# Patient Record
Sex: FEMALE | Race: WHITE | NOT HISPANIC OR LATINO | Employment: OTHER | URBAN - NONMETROPOLITAN AREA
[De-identification: names, ages, dates, MRNs, and addresses within clinical notes are randomized per-mention and may not be internally consistent; named-entity substitution may affect disease eponyms.]

---

## 2017-01-03 ENCOUNTER — HOSPITAL ENCOUNTER (OUTPATIENT)
Dept: MAMMOGRAPHY | Facility: HOSPITAL | Age: 67
Discharge: HOME | End: 2017-01-03
Attending: FAMILY MEDICINE | Admitting: FAMILY MEDICINE

## 2017-01-03 DIAGNOSIS — Z12.31 ENCOUNTER FOR SCREENING MAMMOGRAM FOR MALIGNANT NEOPLASM OF BREAST: ICD-10-CM

## 2017-01-03 PROCEDURE — G0202 SCR MAMMO BI INCL CAD: HCPCS

## 2017-01-03 PROCEDURE — 77063 BREAST TOMOSYNTHESIS BI: CPT

## 2017-01-09 ENCOUNTER — TRANSCRIBE ORDERS (OUTPATIENT)
Dept: ADMINISTRATIVE | Facility: HOSPITAL | Age: 67
End: 2017-01-09

## 2017-01-09 DIAGNOSIS — N64.89 BREAST ASYMMETRY: Primary | ICD-10-CM

## 2017-01-11 ENCOUNTER — HOSPITAL ENCOUNTER (OUTPATIENT)
Dept: ULTRASOUND IMAGING | Facility: HOSPITAL | Age: 67
Discharge: HOME | End: 2017-01-11
Attending: FAMILY MEDICINE

## 2017-01-11 ENCOUNTER — HOSPITAL ENCOUNTER (OUTPATIENT)
Dept: MAMMOGRAPHY | Facility: HOSPITAL | Age: 67
Discharge: HOME | End: 2017-01-11
Attending: FAMILY MEDICINE | Admitting: FAMILY MEDICINE

## 2017-01-11 DIAGNOSIS — N64.89 BREAST ASYMMETRY: ICD-10-CM

## 2017-01-11 PROCEDURE — G0206 DX MAMMO INCL CAD UNI: HCPCS

## 2017-01-11 PROCEDURE — G0279 TOMOSYNTHESIS, MAMMO: HCPCS

## 2017-04-07 ENCOUNTER — TRANSCRIBE ORDERS (OUTPATIENT)
Dept: ADMINISTRATIVE | Facility: HOSPITAL | Age: 67
End: 2017-04-07

## 2017-04-07 ENCOUNTER — APPOINTMENT (OUTPATIENT)
Dept: LAB | Facility: HOSPITAL | Age: 67
End: 2017-04-07
Attending: FAMILY MEDICINE

## 2017-04-07 DIAGNOSIS — M85.9 DISORDER OF BONE DENSITY AND STRUCTURE, UNSPECIFIED: ICD-10-CM

## 2017-04-07 DIAGNOSIS — I10 PRIMARY HYPERTENSION: ICD-10-CM

## 2017-04-07 DIAGNOSIS — E78.5 HYPERLIPIDEMIA, UNSPECIFIED HYPERLIPIDEMIA TYPE: ICD-10-CM

## 2017-04-07 DIAGNOSIS — E11.9 TYPE 2 DIABETES MELLITUS WITHOUT COMPLICATION, UNSPECIFIED LONG TERM INSULIN USE STATUS: Primary | ICD-10-CM

## 2017-04-07 LAB
ALBUMIN SERPL-MCNC: 4.2 G/DL (ref 3.5–5)
ALBUMIN/GLOB SERPL: 1.4 G/DL (ref 1.1–2.5)
ALP SERPL-CCNC: 122 U/L (ref 24–120)
ALT SERPL W P-5'-P-CCNC: 25 U/L (ref 0–54)
ANION GAP SERPL CALCULATED.3IONS-SCNC: 13 MMOL/L (ref 4–13)
ARTICHOKE IGE QN: 111 MG/DL (ref 0–99)
AST SERPL-CCNC: 26 U/L (ref 7–45)
BILIRUB SERPL-MCNC: 0.5 MG/DL (ref 0.1–1)
BUN BLD-MCNC: 22 MG/DL (ref 5–21)
BUN/CREAT SERPL: 30.6 (ref 7–25)
CALCIUM SPEC-SCNC: 9.7 MG/DL (ref 8.4–10.4)
CHLORIDE SERPL-SCNC: 103 MMOL/L (ref 98–110)
CHOLEST SERPL-MCNC: 186 MG/DL (ref 130–200)
CO2 SERPL-SCNC: 28 MMOL/L (ref 24–31)
CREAT BLD-MCNC: 0.72 MG/DL (ref 0.5–1.4)
GFR SERPL CREATININE-BSD FRML MDRD: 81 ML/MIN/1.73
GLOBULIN UR ELPH-MCNC: 2.9 GM/DL
GLUCOSE BLD-MCNC: 145 MG/DL (ref 70–100)
HBA1C MFR BLD: 7.1 %
HDLC SERPL-MCNC: 44 MG/DL
LDLC/HDLC SERPL: 2.52 {RATIO}
POTASSIUM BLD-SCNC: 4.3 MMOL/L (ref 3.5–5.3)
PROT SERPL-MCNC: 7.1 G/DL (ref 6.3–8.7)
SODIUM BLD-SCNC: 144 MMOL/L (ref 135–145)
TRIGL SERPL-MCNC: 155 MG/DL (ref 0–149)

## 2017-04-07 PROCEDURE — 83036 HEMOGLOBIN GLYCOSYLATED A1C: CPT | Performed by: FAMILY MEDICINE

## 2017-04-07 PROCEDURE — 80061 LIPID PANEL: CPT | Performed by: FAMILY MEDICINE

## 2017-04-07 PROCEDURE — 36415 COLL VENOUS BLD VENIPUNCTURE: CPT | Performed by: FAMILY MEDICINE

## 2017-04-07 PROCEDURE — 80053 COMPREHEN METABOLIC PANEL: CPT | Performed by: FAMILY MEDICINE

## 2017-06-15 ENCOUNTER — OFFICE VISIT (OUTPATIENT)
Dept: VASCULAR SURGERY | Facility: CLINIC | Age: 67
End: 2017-06-15

## 2017-06-15 ENCOUNTER — HOSPITAL ENCOUNTER (OUTPATIENT)
Dept: ULTRASOUND IMAGING | Facility: HOSPITAL | Age: 67
Discharge: HOME | End: 2017-06-15
Attending: SURGERY | Admitting: SURGERY

## 2017-06-15 VITALS
HEIGHT: 63 IN | SYSTOLIC BLOOD PRESSURE: 132 MMHG | WEIGHT: 152 LBS | BODY MASS INDEX: 26.93 KG/M2 | HEART RATE: 72 BPM | DIASTOLIC BLOOD PRESSURE: 76 MMHG

## 2017-06-15 DIAGNOSIS — I87.2 VENOUS (PERIPHERAL) INSUFFICIENCY: ICD-10-CM

## 2017-06-15 DIAGNOSIS — I65.23 CAROTID OCCLUSION, BILATERAL: ICD-10-CM

## 2017-06-15 DIAGNOSIS — I65.23 BILATERAL CAROTID ARTERY STENOSIS: Primary | ICD-10-CM

## 2017-06-15 PROCEDURE — 93880 EXTRACRANIAL BILAT STUDY: CPT | Performed by: SURGERY

## 2017-06-15 PROCEDURE — 99214 OFFICE O/P EST MOD 30 MIN: CPT | Performed by: SURGERY

## 2017-06-15 PROCEDURE — 93880 EXTRACRANIAL BILAT STUDY: CPT

## 2017-06-15 NOTE — PROGRESS NOTES
"06/15/2017      Komal Laws DO  [address]  [place] [address]        Kaelyn Conte  1950    Chief Complaint   Patient presents with   • Follow-up     US carotid bilateral 06/15/17 Patient denies any stroke like symptoms or leg pain. She is complaining of some dizziness this morning.        Dear Komal Laws DO:    HPI     I had the pleasure of seeing you patient in the office today for follow up.  As you recall, the patient is a 67 y.o. female who we are currently following for Asymptomatic carotid occlusive disease.  Currently she is doing quite well and has no complaints.  She denies any strokelike symptoms.  She also denies any symptoms of claudication in the lower extremities.  She had noninvasive testing performed in which I personally reviewed here today.      /76  Pulse 72  Ht 63\" (160 cm)  Wt 152 lb (68.9 kg)  LMP  (LMP Unknown)  BMI 26.93 kg/m2  Physical Exam   Constitutional: She is oriented to person, place, and time. She appears well-developed and well-nourished.   HENT:   Head: Normocephalic and atraumatic.   Neck: Neck supple. No JVD present. Carotid bruit is not present. No thyromegaly present.   Cardiovascular: Normal rate, regular rhythm and normal heart sounds.    Pulses:       Carotid pulses are 2+ on the right side, and 2+ on the left side.       Femoral pulses are 2+ on the right side, and 2+ on the left side.       Popliteal pulses are 2+ on the right side, and 2+ on the left side.        Dorsalis pedis pulses are 2+ on the right side, and 2+ on the left side.        Posterior tibial pulses are 2+ on the right side, and 2+ on the left side.   Pulmonary/Chest: Effort normal and breath sounds normal.   Abdominal: Soft. Bowel sounds are normal. She exhibits no distension, no abdominal bruit and no mass. There is no hepatosplenomegaly. There is no tenderness.   Musculoskeletal: Normal range of motion. She exhibits no edema.   Neurological: She is " alert and oriented to person, place, and time. She has normal strength. No cranial nerve deficit or sensory deficit.   Skin: Skin is warm and intact.   Nursing note and vitals reviewed.      DIAGNOSTIC DATA:    Us Carotid Bilateral    Result Date: 6/15/2017  Narrative: History: Carotid occlusive disease      Impression: Impression: 1. There is 50-69% stenosis of the right internal carotid artery. 2. There is 50-69% stenosis of the left internal carotid artery. 3. Antegrade flow is demonstrated in bilateral vertebral arteries.  Comments: Bilateral carotid vertebral arterial duplex scan was performed.  Grayscale imaging shows intimal thickening and calcified elements at the carotid bifurcation. The right internal carotid artery peak systolic velocity is 138 cm/sec. The end-diastolic velocity is 46.4 cm/sec. The right ICA/CCA ratio is approximately 1.14 . These findings correlate with 50-69% stenosis of the right internal carotid artery.  Grayscale imaging shows intimal thickening and calcified elements at the carotid bifurcation. The left internal carotid artery peak systolic velocity is 130 cm/sec. The end-diastolic velocity is 43.2 cm/sec. The left ICA/CCA ratio is approximately 1.06 . These findings correlate with 50-69% stenosis of the left internal carotid artery.  Antegrade flow is demonstrated in bilateral vertebral arteries.    This report was finalized on 06/15/2017 17:29 by Dr. Silvio Macias MD.      Patient Active Problem List   Diagnosis   • Diabetes mellitus   • Carotid stenosis, asymptomatic   • Venous (peripheral) insufficiency         ICD-10-CM ICD-9-CM   1. Bilateral carotid artery stenosis I65.23 433.10     433.30   2. Venous (peripheral) insufficiency I87.2 459.81           PLAN: After thoroughly evaluating Kaelyn Conte, I believe the best course of action is to remain conservative from a vascular standpoint.  We will see Kaelyn Conte back in 1 year with repeat noninvasive testing for  continued surveillance.  I also encouraged her to continue to wear her compression stockings on a daily basis and to keep her legs elevated when she is not on them.  The patient is to continue taking their medications as previously discussed.   This was all discussed in full with complete understanding.    Thank you for allowing me to participate in the care of your patient.  Please do not hesitate to call with any questions or concerns.  We will keep you aware of any further encounters with Kaelyn Conte.      Sincerely Yours,      Silvio Macias, DO

## 2017-08-01 ENCOUNTER — TELEPHONE (OUTPATIENT)
Dept: GASTROENTEROLOGY | Facility: CLINIC | Age: 67
End: 2017-08-01

## 2017-08-01 NOTE — TELEPHONE ENCOUNTER
Patient called stating that she has been on pantoprazole for a few years and she has heard that it is bad for bone health if taken for a long period of time. She wants to know if she can be put on something else. She denies having any problems at this time.

## 2017-08-02 NOTE — TELEPHONE ENCOUNTER
Can cause cause decrease calcium in bones leading to osteopenia and or osteoporosis. Nexium, Protonix, Dexilant, Prevacid are all PPIs, therefore, to get out of that class of medicine she would have to try OTC Zantac (rantidine) and see if that worked for her.

## 2017-08-10 ENCOUNTER — TRANSCRIBE ORDERS (OUTPATIENT)
Dept: ADMINISTRATIVE | Facility: HOSPITAL | Age: 67
End: 2017-08-10

## 2017-08-10 ENCOUNTER — APPOINTMENT (OUTPATIENT)
Dept: LAB | Facility: HOSPITAL | Age: 67
End: 2017-08-10
Attending: FAMILY MEDICINE

## 2017-08-10 DIAGNOSIS — M85.9 DISORDER OF BONE DENSITY AND STRUCTURE, UNSPECIFIED: ICD-10-CM

## 2017-08-10 DIAGNOSIS — E78.5 HYPERLIPIDEMIA, UNSPECIFIED HYPERLIPIDEMIA TYPE: ICD-10-CM

## 2017-08-10 DIAGNOSIS — I10 ESSENTIAL HYPERTENSION: ICD-10-CM

## 2017-08-10 DIAGNOSIS — E11.9 TYPE 2 DIABETES MELLITUS WITHOUT COMPLICATION, UNSPECIFIED LONG TERM INSULIN USE STATUS: Primary | ICD-10-CM

## 2017-08-10 LAB
ALBUMIN SERPL-MCNC: 4.2 G/DL (ref 3.5–5)
ALBUMIN/GLOB SERPL: 1.5 G/DL (ref 1.1–2.5)
ALP SERPL-CCNC: 92 U/L (ref 24–120)
ALT SERPL W P-5'-P-CCNC: 36 U/L (ref 0–54)
ANION GAP SERPL CALCULATED.3IONS-SCNC: 9 MMOL/L (ref 4–13)
AST SERPL-CCNC: 29 U/L (ref 7–45)
BILIRUB SERPL-MCNC: 0.7 MG/DL (ref 0.1–1)
BUN BLD-MCNC: 22 MG/DL (ref 5–21)
BUN/CREAT SERPL: 29.7 (ref 7–25)
CALCIUM SPEC-SCNC: 9.4 MG/DL (ref 8.4–10.4)
CHLORIDE SERPL-SCNC: 105 MMOL/L (ref 98–110)
CO2 SERPL-SCNC: 27 MMOL/L (ref 24–31)
CREAT BLD-MCNC: 0.74 MG/DL (ref 0.5–1.4)
GFR SERPL CREATININE-BSD FRML MDRD: 78 ML/MIN/1.73
GLOBULIN UR ELPH-MCNC: 2.8 GM/DL
GLUCOSE BLD-MCNC: 125 MG/DL (ref 70–100)
HBA1C MFR BLD: 6.5 %
POTASSIUM BLD-SCNC: 5.1 MMOL/L (ref 3.5–5.3)
PROT SERPL-MCNC: 7 G/DL (ref 6.3–8.7)
SODIUM BLD-SCNC: 141 MMOL/L (ref 135–145)

## 2017-08-10 PROCEDURE — 36415 COLL VENOUS BLD VENIPUNCTURE: CPT | Performed by: FAMILY MEDICINE

## 2017-08-10 PROCEDURE — 80053 COMPREHEN METABOLIC PANEL: CPT | Performed by: FAMILY MEDICINE

## 2017-08-10 PROCEDURE — 83036 HEMOGLOBIN GLYCOSYLATED A1C: CPT | Performed by: FAMILY MEDICINE

## 2018-01-03 ENCOUNTER — TRANSCRIBE ORDERS (OUTPATIENT)
Dept: ADMINISTRATIVE | Facility: HOSPITAL | Age: 68
End: 2018-01-03

## 2018-01-03 ENCOUNTER — APPOINTMENT (OUTPATIENT)
Dept: LAB | Facility: HOSPITAL | Age: 68
End: 2018-01-03
Attending: FAMILY MEDICINE

## 2018-01-03 DIAGNOSIS — Z00.00 GENERAL MEDICAL EXAM: ICD-10-CM

## 2018-01-03 DIAGNOSIS — E11.8 TYPE 2 DIABETES MELLITUS WITH COMPLICATION, UNSPECIFIED LONG TERM INSULIN USE STATUS: Primary | ICD-10-CM

## 2018-01-03 DIAGNOSIS — M85.9 DISORDER OF BONE DENSITY AND STRUCTURE, UNSPECIFIED: ICD-10-CM

## 2018-01-03 DIAGNOSIS — I10 ESSENTIAL HYPERTENSION, MALIGNANT: ICD-10-CM

## 2018-01-03 DIAGNOSIS — E78.5 HYPERLIPIDEMIA, UNSPECIFIED HYPERLIPIDEMIA TYPE: ICD-10-CM

## 2018-01-03 LAB
ALBUMIN SERPL-MCNC: 4 G/DL (ref 3.5–5)
ALBUMIN/GLOB SERPL: 1.3 G/DL (ref 1.1–2.5)
ALP SERPL-CCNC: 101 U/L (ref 24–120)
ALT SERPL W P-5'-P-CCNC: 33 U/L (ref 0–54)
ANION GAP SERPL CALCULATED.3IONS-SCNC: 8 MMOL/L (ref 4–13)
ARTICHOKE IGE QN: 121 MG/DL (ref 0–99)
AST SERPL-CCNC: 25 U/L (ref 7–45)
BILIRUB SERPL-MCNC: 0.4 MG/DL (ref 0.1–1)
BUN BLD-MCNC: 16 MG/DL (ref 5–21)
BUN/CREAT SERPL: 23.9 (ref 7–25)
CALCIUM SPEC-SCNC: 9.4 MG/DL (ref 8.4–10.4)
CHLORIDE SERPL-SCNC: 104 MMOL/L (ref 98–110)
CHOLEST SERPL-MCNC: 178 MG/DL (ref 130–200)
CO2 SERPL-SCNC: 33 MMOL/L (ref 24–31)
CREAT BLD-MCNC: 0.67 MG/DL (ref 0.5–1.4)
DEPRECATED RDW RBC AUTO: 40.4 FL (ref 40–54)
ERYTHROCYTE [DISTWIDTH] IN BLOOD BY AUTOMATED COUNT: 12.2 % (ref 12–15)
GFR SERPL CREATININE-BSD FRML MDRD: 88 ML/MIN/1.73
GLOBULIN UR ELPH-MCNC: 3 GM/DL
GLUCOSE BLD-MCNC: 152 MG/DL (ref 70–100)
HBA1C MFR BLD: 6.8 %
HCT VFR BLD AUTO: 42.2 % (ref 37–47)
HDLC SERPL-MCNC: 45 MG/DL
HGB BLD-MCNC: 14.2 G/DL (ref 12–16)
LDLC/HDLC SERPL: 2.19 {RATIO}
MCH RBC QN AUTO: 30.7 PG (ref 28–32)
MCHC RBC AUTO-ENTMCNC: 33.6 G/DL (ref 33–36)
MCV RBC AUTO: 91.1 FL (ref 82–98)
PLATELET # BLD AUTO: 234 10*3/MM3 (ref 130–400)
PMV BLD AUTO: 10.2 FL (ref 6–12)
POTASSIUM BLD-SCNC: 4.9 MMOL/L (ref 3.5–5.3)
PROT SERPL-MCNC: 7 G/DL (ref 6.3–8.7)
RBC # BLD AUTO: 4.63 10*6/MM3 (ref 4.2–5.4)
SODIUM BLD-SCNC: 145 MMOL/L (ref 135–145)
T3FREE SERPL-MCNC: 4.56 PG/ML (ref 2.77–5.27)
T4 FREE SERPL-MCNC: 0.85 NG/DL (ref 0.78–2.19)
TRIGL SERPL-MCNC: 173 MG/DL (ref 0–149)
TSH SERPL DL<=0.05 MIU/L-ACNC: 3.16 MIU/ML (ref 0.47–4.68)
URATE SERPL-MCNC: 5.7 MG/DL (ref 2.7–7.5)
WBC NRBC COR # BLD: 7.83 10*3/MM3 (ref 4.8–10.8)

## 2018-01-03 PROCEDURE — 85027 COMPLETE CBC AUTOMATED: CPT | Performed by: FAMILY MEDICINE

## 2018-01-03 PROCEDURE — 80053 COMPREHEN METABOLIC PANEL: CPT | Performed by: FAMILY MEDICINE

## 2018-01-03 PROCEDURE — 84443 ASSAY THYROID STIM HORMONE: CPT | Performed by: FAMILY MEDICINE

## 2018-01-03 PROCEDURE — 84439 ASSAY OF FREE THYROXINE: CPT | Performed by: FAMILY MEDICINE

## 2018-01-03 PROCEDURE — 82043 UR ALBUMIN QUANTITATIVE: CPT | Performed by: FAMILY MEDICINE

## 2018-01-03 PROCEDURE — 82570 ASSAY OF URINE CREATININE: CPT | Performed by: FAMILY MEDICINE

## 2018-01-03 PROCEDURE — 36415 COLL VENOUS BLD VENIPUNCTURE: CPT

## 2018-01-03 PROCEDURE — 83036 HEMOGLOBIN GLYCOSYLATED A1C: CPT | Performed by: FAMILY MEDICINE

## 2018-01-03 PROCEDURE — 84481 FREE ASSAY (FT-3): CPT | Performed by: FAMILY MEDICINE

## 2018-01-03 PROCEDURE — 80061 LIPID PANEL: CPT | Performed by: FAMILY MEDICINE

## 2018-01-03 PROCEDURE — 84550 ASSAY OF BLOOD/URIC ACID: CPT | Performed by: FAMILY MEDICINE

## 2018-01-04 LAB
CREAT 24H UR-MCNC: 77.4 MG/DL
MICROALBUMIN UR-MCNC: <3 UG/ML
MICROALBUMIN/CREAT UR: <3.9 MG/G CREAT (ref 0–30)

## 2018-01-10 ENCOUNTER — TRANSCRIBE ORDERS (OUTPATIENT)
Dept: ADMINISTRATIVE | Facility: HOSPITAL | Age: 68
End: 2018-01-10

## 2018-01-10 DIAGNOSIS — Z12.31 ENCOUNTER FOR SCREENING MAMMOGRAM FOR MALIGNANT NEOPLASM OF BREAST: Primary | ICD-10-CM

## 2018-01-12 ENCOUNTER — APPOINTMENT (OUTPATIENT)
Dept: MAMMOGRAPHY | Facility: HOSPITAL | Age: 68
End: 2018-01-12
Attending: FAMILY MEDICINE

## 2018-01-19 ENCOUNTER — HOSPITAL ENCOUNTER (OUTPATIENT)
Dept: MAMMOGRAPHY | Facility: HOSPITAL | Age: 68
Discharge: HOME | End: 2018-01-19
Attending: FAMILY MEDICINE | Admitting: FAMILY MEDICINE

## 2018-01-19 DIAGNOSIS — Z12.31 ENCOUNTER FOR SCREENING MAMMOGRAM FOR MALIGNANT NEOPLASM OF BREAST: ICD-10-CM

## 2018-01-19 PROCEDURE — 77067 SCR MAMMO BI INCL CAD: CPT

## 2018-01-19 PROCEDURE — 77063 BREAST TOMOSYNTHESIS BI: CPT

## 2018-01-25 ENCOUNTER — HOSPITAL ENCOUNTER (OUTPATIENT)
Dept: WOMENS IMAGING | Age: 68
Discharge: HOME | End: 2018-01-25
Payer: COMMERCIAL

## 2018-01-25 DIAGNOSIS — Z12.39 BREAST CANCER SCREENING: ICD-10-CM

## 2018-01-25 DIAGNOSIS — M85.9 DISORDER OF BONE DENSITY AND STRUCTURE, UNSPECIFIED: ICD-10-CM

## 2018-01-25 PROCEDURE — 77080 DXA BONE DENSITY AXIAL: CPT

## 2018-02-07 ENCOUNTER — APPOINTMENT (OUTPATIENT)
Dept: LAB | Facility: HOSPITAL | Age: 68
End: 2018-02-07

## 2018-02-07 ENCOUNTER — TRANSCRIBE ORDERS (OUTPATIENT)
Dept: ADMINISTRATIVE | Facility: HOSPITAL | Age: 68
End: 2018-02-07

## 2018-02-07 DIAGNOSIS — M85.9 DISORDER OF BONE DENSITY AND STRUCTURE, UNSPECIFIED: Primary | ICD-10-CM

## 2018-02-07 LAB — 25(OH)D3 SERPL-MCNC: 43.4 NG/ML (ref 30–100)

## 2018-02-07 PROCEDURE — 82306 VITAMIN D 25 HYDROXY: CPT | Performed by: PHYSICIAN ASSISTANT

## 2018-02-07 PROCEDURE — 36415 COLL VENOUS BLD VENIPUNCTURE: CPT

## 2018-06-11 ENCOUNTER — TELEPHONE (OUTPATIENT)
Dept: VASCULAR SURGERY | Facility: CLINIC | Age: 68
End: 2018-06-11

## 2018-06-11 NOTE — TELEPHONE ENCOUNTER
Patient called in to confirm her office appointment & I reminded her of her test on 6/12/2018.  She acknowledged understanding.    LB

## 2018-06-12 ENCOUNTER — HOSPITAL ENCOUNTER (OUTPATIENT)
Dept: ULTRASOUND IMAGING | Facility: HOSPITAL | Age: 68
Discharge: HOME | End: 2018-06-12
Admitting: NURSE PRACTITIONER

## 2018-06-12 DIAGNOSIS — I65.23 BILATERAL CAROTID ARTERY STENOSIS: ICD-10-CM

## 2018-06-12 PROCEDURE — 93880 EXTRACRANIAL BILAT STUDY: CPT

## 2018-06-12 PROCEDURE — 93880 EXTRACRANIAL BILAT STUDY: CPT | Performed by: SURGERY

## 2018-06-18 ENCOUNTER — TELEPHONE (OUTPATIENT)
Dept: VASCULAR SURGERY | Facility: CLINIC | Age: 68
End: 2018-06-18

## 2018-06-19 ENCOUNTER — OFFICE VISIT (OUTPATIENT)
Dept: VASCULAR SURGERY | Facility: CLINIC | Age: 68
End: 2018-06-19

## 2018-06-19 VITALS
SYSTOLIC BLOOD PRESSURE: 112 MMHG | BODY MASS INDEX: 26.58 KG/M2 | OXYGEN SATURATION: 98 % | WEIGHT: 150 LBS | HEART RATE: 74 BPM | DIASTOLIC BLOOD PRESSURE: 68 MMHG | HEIGHT: 63 IN

## 2018-06-19 DIAGNOSIS — I65.23 BILATERAL CAROTID ARTERY STENOSIS: Primary | ICD-10-CM

## 2018-06-19 DIAGNOSIS — E11.9 TYPE 2 DIABETES MELLITUS WITHOUT COMPLICATION, WITHOUT LONG-TERM CURRENT USE OF INSULIN (HCC): ICD-10-CM

## 2018-06-19 PROCEDURE — 99213 OFFICE O/P EST LOW 20 MIN: CPT | Performed by: NURSE PRACTITIONER

## 2018-06-19 RX ORDER — RANITIDINE 150 MG/1
150 TABLET ORAL 2 TIMES DAILY
COMMUNITY
End: 2018-07-31

## 2018-06-19 RX ORDER — ESCITALOPRAM OXALATE 5 MG/1
5 TABLET ORAL DAILY
COMMUNITY
End: 2019-11-22

## 2018-06-19 NOTE — PROGRESS NOTES
"06/19/2018       Komal Laws DO  [address]  [place] [address]        Kaelyn Conte  1950    Chief Complaint   Patient presents with   • Follow-up     US Carotid Bilat completed on 06-12-18. Pt is here for her 1 year FU.   • Carotid Artery Disease     Pt states that overall she's doing great, she states she's been staying more active. Pt does complain of some mild pain in her left toes especially at night. Pt denies any stroke like symptoms.   • Varicose Veins     Pt did point out some possible varicose veins in the tops of her legs however denies any pain with them.       Dear Komal Laws DO:    HPI     I had the pleasure of seeing you patient in the office today for follow up.  As you recall, the patient is a 68 y.o. female who we are currently following for Asymptomatic carotid occlusive disease.  Currently she is doing quite well and has no complaints.  She denies any strokelike symptoms.  She also denies any symptoms of claudication in the lower extremities.  She has some pain in her toes at night.  She had noninvasive testing performed in which I personally reviewed here today.      /68 (BP Location: Right arm, Patient Position: Sitting, Cuff Size: Adult)   Pulse 74   Ht 160 cm (63\")   Wt 68 kg (150 lb)   LMP  (LMP Unknown)   SpO2 98%   Breastfeeding? No   BMI 26.57 kg/m²   Physical Exam   Constitutional: She is oriented to person, place, and time. She appears well-developed and well-nourished.   HENT:   Head: Normocephalic and atraumatic.   Neck: Neck supple. No JVD present. Carotid bruit is not present. No thyromegaly present.   Cardiovascular: Normal rate, regular rhythm and normal heart sounds.    Pulses:       Carotid pulses are 2+ on the right side, and 2+ on the left side.       Femoral pulses are 2+ on the right side, and 2+ on the left side.       Popliteal pulses are 2+ on the right side, and 2+ on the left side.        Dorsalis pedis pulses are " 2+ on the right side, and 2+ on the left side.        Posterior tibial pulses are 2+ on the right side, and 2+ on the left side.   Pulmonary/Chest: Effort normal and breath sounds normal.   Abdominal: Soft. Bowel sounds are normal. She exhibits no distension, no abdominal bruit and no mass. There is no hepatosplenomegaly. There is no tenderness.   Musculoskeletal: Normal range of motion. She exhibits no edema.   Neurological: She is alert and oriented to person, place, and time. She has normal strength. No cranial nerve deficit or sensory deficit.   Skin: Skin is warm and intact.   Nursing note and vitals reviewed.      DIAGNOSTIC DATA:  Noninvasive testing performed today, including a carotid duplex shows less than 50% stenosis bilaterally with bilateral antegrade vertebral flow.  No results found.    Patient Active Problem List   Diagnosis   • Diabetes mellitus   • Carotid stenosis, asymptomatic   • Venous (peripheral) insufficiency         ICD-10-CM ICD-9-CM   1. Bilateral carotid artery stenosis I65.23 433.10     433.30   2. Type 2 diabetes mellitus without complication, without long-term current use of insulin E11.9 250.00       PLAN: After thoroughly evaluating Kaelyn Conte, I believe the best course of action is to remain conservative from a vascular standpoint.  We will see Kaelyn Conte back in 1 year with repeat noninvasive testing for continued surveillance, including a carotid duplex.  I also encouraged her to continue to wear her compression stockings on a daily basis and to keep her legs elevated when she is not on them.  I did discuss vascular risk factors as they pertain to the progression of vascular disease including controlling her diabetes mellitus.  Body mass index is 26.57 kg/m². She has lost 16 pounds and is controlling her diabetes.  The patient is to continue taking their medications as previously discussed.   This was all discussed in full with complete understanding.    Thank  you for allowing me to participate in the care of your patient.  Please do not hesitate to call with any questions or concerns.  We will keep you aware of any further encounters with Kaelyn Conte.      Sincerely Yours,      HUMBLE Gu

## 2018-07-31 ENCOUNTER — OFFICE VISIT (OUTPATIENT)
Dept: GASTROENTEROLOGY | Facility: CLINIC | Age: 68
End: 2018-07-31

## 2018-07-31 VITALS
BODY MASS INDEX: 27.64 KG/M2 | HEIGHT: 63 IN | DIASTOLIC BLOOD PRESSURE: 66 MMHG | HEART RATE: 74 BPM | WEIGHT: 156 LBS | OXYGEN SATURATION: 98 % | TEMPERATURE: 98.5 F | SYSTOLIC BLOOD PRESSURE: 128 MMHG

## 2018-07-31 DIAGNOSIS — Z80.0 FAMILY HISTORY OF COLON CANCER: ICD-10-CM

## 2018-07-31 DIAGNOSIS — Z86.010 HX OF COLONIC POLYPS: Primary | ICD-10-CM

## 2018-07-31 PROBLEM — Z86.0100 HX OF COLONIC POLYPS: Status: ACTIVE | Noted: 2018-07-31

## 2018-07-31 PROCEDURE — S0260 H&P FOR SURGERY: HCPCS | Performed by: NURSE PRACTITIONER

## 2018-07-31 RX ORDER — SODIUM, POTASSIUM,MAG SULFATES 17.5-3.13G
2 SOLUTION, RECONSTITUTED, ORAL ORAL ONCE
Qty: 2 BOTTLE | Refills: 0 | Status: SHIPPED | OUTPATIENT
Start: 2018-07-31 | End: 2018-07-31

## 2018-07-31 NOTE — PROGRESS NOTES
Chief Complaint   Patient presents with   • Colon Cancer Screening     Patient is here today for colon screening.     Subjective   HPI  Kaelyn Conte is a 68 y.o. female who presents as a referral for preventative maintenance. She has no complaints of nausea or vomiting. No change in bowels. No wt loss. No BRBPR. No melena. There is a family hx for rectal cancer in son. No abdominal pain.  The patient's last colonoscopy was performed on 8/20/13 with findings of diverticulosis.  The patient carries a history of colon polyps.  Past Medical History:   Diagnosis Date   • Acid reflux    • Allergic rhinitis    • Carotid stenosis, asymptomatic     mild   • Diabetes mellitus (CMS/HCC)    • Diverticulitis of colon    • History of ear infections    • History of streptococcal sore throat    • Meniere's disease    • Urinary tract infection      Past Surgical History:   Procedure Laterality Date   • COLONOSCOPY  08/20/2013   • HYSTERECTOMY     • INNER EAR SURGERY Left     had ear surgery during early 20's   • OOPHORECTOMY     • TONSILLECTOMY     • UPPER GASTROINTESTINAL ENDOSCOPY  08/20/2013       Current Outpatient Prescriptions:   •  aspirin 81 MG EC tablet, Take 81 mg by mouth daily., Disp: , Rfl:   •  Calcium Carb-Cholecalciferol (CALCIUM 600-D PO), Take  by mouth 2 (Two) Times a Day., Disp: , Rfl:   •  escitalopram (LEXAPRO) 5 MG tablet, Take 5 mg by mouth Daily., Disp: , Rfl:   •  Multiple Vitamins-Minerals (CENTRUM SILVER PO), Take  by mouth Daily., Disp: , Rfl:   •  triamterene-hydrochlorothiazide (MAXZIDE-25) 37.5-25 MG per tablet, Take 1 tablet by mouth daily., Disp: , Rfl:   •  sodium-potassium-magnesium sulfates (SUPREP BOWEL PREP KIT) 17.5-3.13-1.6 GM/180ML solution oral solution, Take 2 bottles by mouth 1 (One) Time for 1 dose. Split dose prep as directed by office instructions provided.  2 bottles = one kit., Disp: 2 bottle, Rfl: 0  No Known Allergies  Social History     Social History   • Marital status:  "     Spouse name: N/A   • Number of children: N/A   • Years of education: N/A     Occupational History   • Not on file.     Social History Main Topics   • Smoking status: Never Smoker   • Smokeless tobacco: Never Used   • Alcohol use No   • Drug use: No   • Sexual activity: Defer     Other Topics Concern   • Not on file     Social History Narrative   • No narrative on file     Family History   Problem Relation Age of Onset   • Cancer Mother    • Breast cancer Mother    • Stroke Father    • Breast cancer Sister    • Cancer Brother    • No Known Problems Daughter    • Colon cancer Son 42        colon rectal cancer   • No Known Problems Maternal Grandmother    • No Known Problems Paternal Grandmother    • No Known Problems Maternal Aunt    • No Known Problems Paternal Aunt    • BRCA 1/2 Neg Hx    • Endometrial cancer Neg Hx    • Ovarian cancer Neg Hx    • Colon polyps Neg Hx        REVIEW OF SYSTEMS  General: well appearing, no fever chills or sweats, no unexplained wt loss  HEENT: no acute visual or hearing disturbances  Cardiovascular: No chest pain or palpitations  Pulmonary: No shortness of breath, coughing, wheezing or hemoptysis  : No burning, urgency, hematuria, or dysuria  Musculoskeletal: No joint pain or stiffness  Peripheral: no edema  Skin: No lesions or rashes  Neuro: No dizziness, headaches, stroke, syncope  Endocrine: No hot or cold intolerances  Hematological: No blood dyscrasias    Objective   Vitals:    07/31/18 1312   BP: 128/66   Pulse: 74   Temp: 98.5 °F (36.9 °C)   SpO2: 98%   Weight: 70.8 kg (156 lb)   Height: 160 cm (63\")     Body mass index is 27.63 kg/m².    PHYSICAL EXAM  General: age appropriate well nourished well appearing, no acute distress  Head: normocephalic and atraumatic  Global assessment-supple  Neck-No JVD noted, no lymphadenopathy  Pulmonary-clear to auscultation bilaterally, normal respiratory effort  Cardiovascular-normal rate and rhythm, normal heart sounds, S1 and " S2 noted  Abdomen-soft, non tender, non distended, normal bowel sounds all 4 quadrants, no hepatosplenomegaly noted  Extremities-No clubbing cyanosis or edema  Neuro-Non focal, converses appropriately, awake, alert, oriented    Imaging Results (most recent)     None        Assessment/Plan   Kaelyn was seen today for colon cancer screening.    Diagnoses and all orders for this visit:    Hx of colonic polyps  -     Case Request; Standing  -     Case Request    Family history of colon cancer  Comments:  rectal cancer in son age 42  Orders:  -     Case Request; Standing  -     Case Request    Other orders  -     Follow Anesthesia Guidelines / Standing Orders; Future  -     Implement Anesthesia Orders Day of Procedure; Standing  -     Obtain Informed Consent; Standing  -     Verify bowel prep was successful; Standing  -     sodium-potassium-magnesium sulfates (SUPREP BOWEL PREP KIT) 17.5-3.13-1.6 GM/180ML solution oral solution; Take 2 bottles by mouth 1 (One) Time for 1 dose. Split dose prep as directed by office instructions provided.  2 bottles = one kit.      COLONOSCOPY WITH ANESTHESIA (N/A)       Body mass index is 27.63 kg/m². Patient's Body mass index is 27.63 kg/m². BMI is below normal parameters. Recommendations include: no follow-up required.      All risks, benefits, alternatives, and indications of colonoscopy procedure have been discussed with the patient. Risks to include perforation of the colon requiring possible surgery or colostomy, risk of bleeding from biopsies or removal of colon tissue, possibility of missing a colon polyp or cancer, or adverse drug reaction.  Benefits to include the diagnosis and management of disease of the colon and rectum. Alternatives to include barium enema, radiographic evaluation, lab testing or no intervention. Pt verbalizes understanding and agrees.

## 2018-08-31 ENCOUNTER — HOSPITAL ENCOUNTER (OUTPATIENT)
Facility: HOSPITAL | Age: 68
Setting detail: HOSPITAL OUTPATIENT SURGERY
Discharge: HOME | End: 2018-08-31
Attending: INTERNAL MEDICINE | Admitting: ANESTHESIOLOGY

## 2018-08-31 ENCOUNTER — ANESTHESIA (OUTPATIENT)
Dept: GASTROENTEROLOGY | Facility: HOSPITAL | Age: 68
End: 2018-08-31

## 2018-08-31 ENCOUNTER — ANESTHESIA EVENT (OUTPATIENT)
Dept: GASTROENTEROLOGY | Facility: HOSPITAL | Age: 68
End: 2018-08-31

## 2018-08-31 ENCOUNTER — TELEPHONE (OUTPATIENT)
Dept: GASTROENTEROLOGY | Facility: CLINIC | Age: 68
End: 2018-08-31

## 2018-08-31 VITALS
OXYGEN SATURATION: 99 % | HEART RATE: 58 BPM | HEIGHT: 63 IN | DIASTOLIC BLOOD PRESSURE: 55 MMHG | BODY MASS INDEX: 26.22 KG/M2 | SYSTOLIC BLOOD PRESSURE: 113 MMHG | TEMPERATURE: 97.1 F | WEIGHT: 148 LBS | RESPIRATION RATE: 18 BRPM

## 2018-08-31 LAB — GLUCOSE BLDC GLUCOMTR-MCNC: 96 MG/DL (ref 70–130)

## 2018-08-31 PROCEDURE — 82962 GLUCOSE BLOOD TEST: CPT

## 2018-08-31 PROCEDURE — G0105 COLORECTAL SCRN; HI RISK IND: HCPCS | Performed by: INTERNAL MEDICINE

## 2018-08-31 PROCEDURE — 25010000002 PROPOFOL 10 MG/ML EMULSION: Performed by: NURSE ANESTHETIST, CERTIFIED REGISTERED

## 2018-08-31 RX ORDER — SODIUM CHLORIDE 9 MG/ML
100 INJECTION, SOLUTION INTRAVENOUS CONTINUOUS
Status: DISCONTINUED | OUTPATIENT
Start: 2018-08-31 | End: 2018-08-31 | Stop reason: HOSPADM

## 2018-08-31 RX ORDER — PROPOFOL 10 MG/ML
VIAL (ML) INTRAVENOUS AS NEEDED
Status: DISCONTINUED | OUTPATIENT
Start: 2018-08-31 | End: 2018-08-31 | Stop reason: SURG

## 2018-08-31 RX ORDER — SODIUM CHLORIDE 0.9 % (FLUSH) 0.9 %
1-10 SYRINGE (ML) INJECTION AS NEEDED
Status: DISCONTINUED | OUTPATIENT
Start: 2018-08-31 | End: 2018-08-31 | Stop reason: HOSPADM

## 2018-08-31 RX ADMIN — SODIUM CHLORIDE: 9 INJECTION, SOLUTION INTRAVENOUS at 13:20

## 2018-08-31 RX ADMIN — PROPOFOL 300 MG: 10 INJECTION, EMULSION INTRAVENOUS at 13:25

## 2018-09-04 ENCOUNTER — APPOINTMENT (OUTPATIENT)
Dept: LAB | Facility: HOSPITAL | Age: 68
End: 2018-09-04
Attending: FAMILY MEDICINE

## 2018-09-04 ENCOUNTER — TRANSCRIBE ORDERS (OUTPATIENT)
Dept: LAB | Facility: HOSPITAL | Age: 68
End: 2018-09-04

## 2018-09-04 DIAGNOSIS — IMO0002 TYPE II DIABETES MELLITUS WITH COMA, UNCONTROLLED: Primary | ICD-10-CM

## 2018-09-04 DIAGNOSIS — I10 ESSENTIAL HYPERTENSION, MALIGNANT: ICD-10-CM

## 2018-09-04 DIAGNOSIS — E78.5 HYPERLIPIDEMIA, UNSPECIFIED HYPERLIPIDEMIA TYPE: ICD-10-CM

## 2018-09-04 LAB
ALBUMIN SERPL-MCNC: 4.4 G/DL (ref 3.5–5)
ALBUMIN/GLOB SERPL: 1.6 G/DL (ref 1.1–2.5)
ALP SERPL-CCNC: 90 U/L (ref 24–120)
ALT SERPL W P-5'-P-CCNC: 23 U/L (ref 0–54)
ANION GAP SERPL CALCULATED.3IONS-SCNC: 10 MMOL/L (ref 4–13)
ARTICHOKE IGE QN: 83 MG/DL (ref 0–99)
AST SERPL-CCNC: 27 U/L (ref 7–45)
BILIRUB SERPL-MCNC: 0.6 MG/DL (ref 0.1–1)
BUN BLD-MCNC: 18 MG/DL (ref 5–21)
BUN/CREAT SERPL: 27.7 (ref 7–25)
CALCIUM SPEC-SCNC: 9.9 MG/DL (ref 8.4–10.4)
CHLORIDE SERPL-SCNC: 106 MMOL/L (ref 98–110)
CHOLEST SERPL-MCNC: 149 MG/DL (ref 130–200)
CO2 SERPL-SCNC: 30 MMOL/L (ref 24–31)
CREAT BLD-MCNC: 0.65 MG/DL (ref 0.5–1.4)
GFR SERPL CREATININE-BSD FRML MDRD: 91 ML/MIN/1.73
GLOBULIN UR ELPH-MCNC: 2.7 GM/DL
GLUCOSE BLD-MCNC: 130 MG/DL (ref 70–100)
HBA1C MFR BLD: 6.9 %
HDLC SERPL-MCNC: 45 MG/DL
LDLC/HDLC SERPL: 1.74 {RATIO}
POTASSIUM BLD-SCNC: 5.3 MMOL/L (ref 3.5–5.3)
PROT SERPL-MCNC: 7.1 G/DL (ref 6.3–8.7)
SODIUM BLD-SCNC: 146 MMOL/L (ref 135–145)
TRIGL SERPL-MCNC: 129 MG/DL (ref 0–149)

## 2018-09-04 PROCEDURE — 80053 COMPREHEN METABOLIC PANEL: CPT | Performed by: FAMILY MEDICINE

## 2018-09-04 PROCEDURE — 83036 HEMOGLOBIN GLYCOSYLATED A1C: CPT | Performed by: FAMILY MEDICINE

## 2018-09-04 PROCEDURE — 80061 LIPID PANEL: CPT | Performed by: FAMILY MEDICINE

## 2018-09-04 PROCEDURE — 36415 COLL VENOUS BLD VENIPUNCTURE: CPT

## 2018-11-07 ENCOUNTER — HOSPITAL ENCOUNTER (OUTPATIENT)
Dept: PHYSICAL THERAPY | Age: 68
Setting detail: THERAPIES SERIES
Discharge: HOME | End: 2018-11-07
Payer: COMMERCIAL

## 2018-11-07 PROCEDURE — 97012 MECHANICAL TRACTION THERAPY: CPT

## 2018-11-07 PROCEDURE — 97162 PT EVAL MOD COMPLEX 30 MIN: CPT

## 2018-11-07 PROCEDURE — G8981 BODY POS CURRENT STATUS: HCPCS

## 2018-11-07 PROCEDURE — G8982 BODY POS GOAL STATUS: HCPCS

## 2018-11-07 ASSESSMENT — PAIN DESCRIPTION - ORIENTATION: ORIENTATION: LEFT

## 2018-11-07 ASSESSMENT — PAIN SCALES - GENERAL: PAINLEVEL_OUTOF10: 2

## 2018-11-07 ASSESSMENT — PAIN DESCRIPTION - LOCATION: LOCATION: NECK;SHOULDER

## 2018-11-07 ASSESSMENT — PAIN DESCRIPTION - PAIN TYPE: TYPE: CHRONIC PAIN

## 2018-11-09 ENCOUNTER — APPOINTMENT (OUTPATIENT)
Dept: PHYSICAL THERAPY | Age: 68
End: 2018-11-09
Payer: COMMERCIAL

## 2018-11-14 ENCOUNTER — HOSPITAL ENCOUNTER (OUTPATIENT)
Dept: PHYSICAL THERAPY | Age: 68
Setting detail: THERAPIES SERIES
Discharge: HOME | End: 2018-11-14
Payer: COMMERCIAL

## 2018-11-14 PROCEDURE — 97012 MECHANICAL TRACTION THERAPY: CPT

## 2018-11-14 PROCEDURE — 97530 THERAPEUTIC ACTIVITIES: CPT

## 2018-11-14 ASSESSMENT — PAIN DESCRIPTION - ORIENTATION: ORIENTATION: LEFT

## 2018-11-14 ASSESSMENT — PAIN DESCRIPTION - LOCATION: LOCATION: NECK;SHOULDER

## 2018-11-14 ASSESSMENT — PAIN SCALES - GENERAL: PAINLEVEL_OUTOF10: 2

## 2018-11-14 ASSESSMENT — PAIN DESCRIPTION - PAIN TYPE: TYPE: CHRONIC PAIN

## 2018-11-19 ENCOUNTER — HOSPITAL ENCOUNTER (OUTPATIENT)
Dept: PHYSICAL THERAPY | Age: 68
Setting detail: THERAPIES SERIES
Discharge: HOME | End: 2018-11-19
Payer: COMMERCIAL

## 2018-11-19 PROCEDURE — 97110 THERAPEUTIC EXERCISES: CPT

## 2018-11-19 PROCEDURE — 97012 MECHANICAL TRACTION THERAPY: CPT

## 2018-11-21 ENCOUNTER — HOSPITAL ENCOUNTER (OUTPATIENT)
Dept: PHYSICAL THERAPY | Age: 68
Setting detail: THERAPIES SERIES
Discharge: HOME | End: 2018-11-21
Payer: COMMERCIAL

## 2018-11-21 PROCEDURE — 97110 THERAPEUTIC EXERCISES: CPT

## 2018-11-21 PROCEDURE — 97012 MECHANICAL TRACTION THERAPY: CPT

## 2018-11-26 ENCOUNTER — HOSPITAL ENCOUNTER (OUTPATIENT)
Dept: PHYSICAL THERAPY | Age: 68
Setting detail: THERAPIES SERIES
Discharge: HOME | End: 2018-11-26
Payer: COMMERCIAL

## 2018-11-26 PROCEDURE — 97110 THERAPEUTIC EXERCISES: CPT

## 2018-11-26 PROCEDURE — 97012 MECHANICAL TRACTION THERAPY: CPT

## 2018-11-28 ENCOUNTER — HOSPITAL ENCOUNTER (OUTPATIENT)
Dept: PHYSICAL THERAPY | Age: 68
Setting detail: THERAPIES SERIES
Discharge: HOME | End: 2018-11-28
Payer: COMMERCIAL

## 2018-11-28 PROCEDURE — 97012 MECHANICAL TRACTION THERAPY: CPT

## 2018-11-28 PROCEDURE — 97110 THERAPEUTIC EXERCISES: CPT

## 2018-11-28 ASSESSMENT — PAIN DESCRIPTION - ORIENTATION: ORIENTATION: LEFT

## 2018-11-28 ASSESSMENT — PAIN DESCRIPTION - LOCATION: LOCATION: NECK;SHOULDER

## 2018-11-28 ASSESSMENT — PAIN SCALES - GENERAL: PAINLEVEL_OUTOF10: 2

## 2018-11-28 ASSESSMENT — PAIN DESCRIPTION - PAIN TYPE: TYPE: CHRONIC PAIN

## 2018-11-28 NOTE — PROGRESS NOTES
performing L upper trap stretch but able to correct with tactile cues, increased reps with  ball roll up wall. Treatment Diagnosis: Neck/shoulder pain  REQUIRES PT FOLLOW UP: Yes      G-Code:    Goals:  Short term goals  Time Frame for Short term goals: 3-4 weeks  Short term goal 1: Patient will be independent with HEP  Short term goal 2: Patient will perform good chin tuck with strong 5 second hold to improve strength of cervical postural muscles  Short term goal 3: Patient will increased strength of L shoulder abduction to 5/5 as needed for functional mobility  Long term goals  Time Frame for Long term goals : 4-6 weeks  Long term goal 1: Patient will improve score on NPDIQ to 8% impairment or less to demonstrate decreased disability due to neck pain. Long term goal 2: Patient will report no increase in pain for short driving distances and only minimal increase in pain for longer distances to demo improved ability to perform IADLs  Long term goal 3: Patient will improve resting posture with decreased forward head posture/improved cervical alignment for improved cervical biomechanics  Long term goal 4: Patient will report decrease in average pain rating to 1/10 or less for improved quality of life  Patient Goals   Patient goals :  To be able to drive without pain    Plan:    Plan  Times per week: 2x/week  Plan weeks: 4-6 weeks  Current Treatment Recommendations: Strengthening, ROM, Functional Mobility Training, Neuromuscular Re-education, Home Exercise Program, Manual Therapy - Joint Manipulation, Manual Therapy - Soft Tissue Mobilization, Modalities  Timed Code Treatment Minutes: 24 Minutes     Therapy Time   Individual Concurrent Group Co-treatment   Time In 6127         Time Out 0535         Minutes 46         Timed Code Treatment Minutes: TOPHER Schafer    Electronically signed by Melecio Still PTA on 11/28/2018 at 10:02 AM

## 2018-12-03 ENCOUNTER — APPOINTMENT (OUTPATIENT)
Dept: PHYSICAL THERAPY | Age: 68
End: 2018-12-03
Payer: COMMERCIAL

## 2018-12-05 ENCOUNTER — HOSPITAL ENCOUNTER (OUTPATIENT)
Dept: PHYSICAL THERAPY | Age: 68
Setting detail: THERAPIES SERIES
Discharge: HOME | End: 2018-12-05
Payer: COMMERCIAL

## 2018-12-05 ENCOUNTER — TELEPHONE (OUTPATIENT)
Dept: VASCULAR SURGERY | Facility: CLINIC | Age: 68
End: 2018-12-05

## 2018-12-05 PROCEDURE — 97012 MECHANICAL TRACTION THERAPY: CPT

## 2018-12-05 PROCEDURE — G8982 BODY POS GOAL STATUS: HCPCS

## 2018-12-05 PROCEDURE — G8981 BODY POS CURRENT STATUS: HCPCS

## 2018-12-05 PROCEDURE — 97110 THERAPEUTIC EXERCISES: CPT

## 2018-12-05 NOTE — TELEPHONE ENCOUNTER
Left message reminding Mrs Conte of her appointments for Thursday, December 6th, 2018. Reminded [facility]30 am for testing and follow up afterwards at 1130 am with Dr Macias. Also advised if she had any questions or needed to reschedule to please call the office at [phone].

## 2018-12-05 NOTE — PROGRESS NOTES
and abduction--red band, 10/10   not today  Exercise 11: ICT x 20 minutes 23#   Exercise 12: Estim + HP if higher levels of pain--not today  Exercise 13: 11/19/18: HEP ISSUED                                   Assessment:   Conditions Requiring Skilled Therapeutic Intervention  Body structures, Functions, Activity limitations: Decreased functional mobility ; Decreased ADL status; Decreased strength;Decreased high-level IADLs  Assessment: Patient reassessed today for therapy needs. Patient has made good progress with therapy and has decreased pain and increased functional mobility with driving however does still have some pain and limitations. Patient has met 4/7 goals but still has 3 goals to continue working toward. Patient is very compliant and gives excellent effort in therapy and will continue to improve with therapy. Treatment Diagnosis: Neck/shoulder pain  REQUIRES PT FOLLOW UP: Yes      G-Code:  PT G-Codes  Functional Assessment Tool Used: Neck Pain Disability Index Questionnaire  Score: 8% impairment  Functional Limitation: Changing and maintaining body position  Changing and Maintaining Body Position Current Status (): At least 1 percent but less than 20 percent impaired, limited or restricted  Changing and Maintaining Body Position Goal Status ():  At least 1 percent but less than 20 percent impaired, limited or restricted    Goals:  Short term goals  Time Frame for Short term goals: 3-4 weeks  Short term goal 1: Patient will be independent with HEP - met (12/5 Patient reports she is performing exercises daily at home)  Short term goal 2: Patient will perform good chin tuck with strong 5 second hold to improve strength of cervical postural muscles - met (12/5 Patient performing good chin tuck without cuing)  Short term goal 3: Patient will increased strength of L shoulder abduction to 5/5 as needed for functional mobility - met (12/5 Patient 5/5 throughout UEs today)  Long term goals  Time Frame for

## 2018-12-06 ENCOUNTER — OFFICE VISIT (OUTPATIENT)
Dept: VASCULAR SURGERY | Facility: CLINIC | Age: 68
End: 2018-12-06

## 2018-12-06 ENCOUNTER — HOSPITAL ENCOUNTER (OUTPATIENT)
Dept: ULTRASOUND IMAGING | Facility: HOSPITAL | Age: 68
Discharge: HOME | End: 2018-12-06
Admitting: NURSE PRACTITIONER

## 2018-12-06 VITALS
HEIGHT: 63 IN | BODY MASS INDEX: 26.93 KG/M2 | HEART RATE: 76 BPM | OXYGEN SATURATION: 99 % | SYSTOLIC BLOOD PRESSURE: 132 MMHG | WEIGHT: 152 LBS | DIASTOLIC BLOOD PRESSURE: 84 MMHG

## 2018-12-06 DIAGNOSIS — I65.23 BILATERAL CAROTID ARTERY STENOSIS: ICD-10-CM

## 2018-12-06 DIAGNOSIS — I65.23 BILATERAL CAROTID ARTERY STENOSIS: Primary | ICD-10-CM

## 2018-12-06 PROCEDURE — 93880 EXTRACRANIAL BILAT STUDY: CPT

## 2018-12-06 PROCEDURE — 93880 EXTRACRANIAL BILAT STUDY: CPT | Performed by: SURGERY

## 2018-12-06 PROCEDURE — 99213 OFFICE O/P EST LOW 20 MIN: CPT | Performed by: SURGERY

## 2018-12-06 NOTE — PROGRESS NOTES
"12/6/2018       Komal Laws DO  [address]  [place] [address]        Kaelyn Conte  1950    Chief Complaint   Patient presents with   • Follow-up     6 month f/u with carotids.  Pt states that she is doing well.       Dear Komal Laws DO:    HPI     I had the pleasure of seeing you patient in the office today for follow up.  As you recall, the patient is a 68 y.o. female who we are currently following for Asymptomatic carotid occlusive disease.   She denies any strokelike symptoms.  She also denies any symptoms of claudication in the lower extremities.  She has some pain in her toes at night, but this is unchanged.  She is maintained on aspirin.  She had noninvasive testing performed in which I personally reviewed here today.      Review of Systems   Constitutional: Negative.    HENT: Negative.    Eyes: Negative.    Respiratory: Negative.    Cardiovascular: Negative.    Gastrointestinal: Negative.    Endocrine: Negative.    Genitourinary: Negative.    Musculoskeletal: Negative.    Skin: Negative.    Allergic/Immunologic: Negative.    Neurological: Negative.    Hematological: Negative.    Psychiatric/Behavioral: Negative.        /84   Pulse 76   Ht 160 cm (63\")   Wt 68.9 kg (152 lb)   LMP  (LMP Unknown)   SpO2 99%   BMI 26.93 kg/m²   Physical Exam   Constitutional: She is oriented to person, place, and time. She appears well-developed and well-nourished.   HENT:   Head: Normocephalic and atraumatic.   Neck: Neck supple. No JVD present. Carotid bruit is not present. No thyromegaly present.   Cardiovascular: Normal rate, regular rhythm, normal heart sounds and intact distal pulses.   Pulses:       Carotid pulses are 2+ on the right side, and 2+ on the left side.       Femoral pulses are 2+ on the right side, and 2+ on the left side.       Popliteal pulses are 2+ on the right side, and 2+ on the left side.        Dorsalis pedis pulses are 2+ on the right side, and " 2+ on the left side.        Posterior tibial pulses are 2+ on the right side, and 2+ on the left side.   Pulmonary/Chest: Effort normal and breath sounds normal.   Abdominal: Soft. Bowel sounds are normal. She exhibits no distension, no abdominal bruit and no mass. There is no hepatosplenomegaly. There is no tenderness.   Musculoskeletal: Normal range of motion. She exhibits no edema.   Neurological: She is alert and oriented to person, place, and time. She has normal strength. No cranial nerve deficit or sensory deficit.   Skin: Skin is warm and intact.   Nursing note and vitals reviewed.      DIAGNOSTIC DATA:  Noninvasive testing performed today, including a carotid duplex shows less than 50% stenosis bilaterally with bilateral antegrade vertebral flow.      Patient Active Problem List   Diagnosis   • Diabetes mellitus (CMS/HCC)   • Carotid stenosis, asymptomatic   • Venous (peripheral) insufficiency   • Hx of colonic polyps   • Family history of colon cancer         ICD-10-CM ICD-9-CM   1. Bilateral carotid artery stenosis I65.23 433.10     433.30       PLAN: After thoroughly evaluating Kaelyn Conte, I believe the best course of action is to remain conservative from a vascular standpoint.  Her testing is unchanged, and remains less than 50% stenosis.  We will see Kaelyn Conte back in 1 year with repeat noninvasive testing for continued surveillance, including a carotid duplex.  I also encouraged her to continue to wear her compression stockings on a daily basis and to keep her legs elevated when she is not on them.  I did discuss vascular risk factors as they pertain to the progression of vascular disease including controlling her diabetes mellitus and hypertension.  Body mass index is 26.93 kg/m².  The patient is to continue taking their medications as previously discussed.   This was all discussed in full with complete understanding.    Thank you for allowing me to participate in the care of your  patient.  Please do not hesitate to call with any questions or concerns.  We will keep you aware of any further encounters with Kaelyn Conte.      Sincerely Yours,      HUMBLE Gu

## 2018-12-07 ENCOUNTER — APPOINTMENT (OUTPATIENT)
Dept: PHYSICAL THERAPY | Age: 68
End: 2018-12-07
Payer: COMMERCIAL

## 2018-12-10 ENCOUNTER — APPOINTMENT (OUTPATIENT)
Dept: PHYSICAL THERAPY | Age: 68
End: 2018-12-10
Payer: COMMERCIAL

## 2018-12-12 ENCOUNTER — HOSPITAL ENCOUNTER (OUTPATIENT)
Dept: PHYSICAL THERAPY | Age: 68
Setting detail: THERAPIES SERIES
Discharge: HOME | End: 2018-12-12
Payer: COMMERCIAL

## 2018-12-12 PROCEDURE — 97110 THERAPEUTIC EXERCISES: CPT

## 2018-12-12 PROCEDURE — 97012 MECHANICAL TRACTION THERAPY: CPT

## 2018-12-12 NOTE — PROGRESS NOTES
Daily Treatment Note  Date: 2018  Patient Name: Basil Mckeon  MRN: [id number]     :   1950    Subjective:   General  Chart Reviewed: Yes  Additional Pertinent Hx: Ms Carroll Dinero presents with L sided neck and shoulder pain that has been present for many years. Additional MHx includes osteoporosis, OA, HTN, and carotid artery occlusion. Family / Caregiver Present: No  Referring Practitioner: Susannah Banks DO  PT Visit Information  Onset Date: 18  PT Insurance Information: [employer] - no precert  Total # of Visits Approved: 12  Total # of Visits to Date: 8  Plan of Care/Certification Expiration Date: 19  Progress Note Due Date: 19  Subjective  Subjective: I have been sick since last Friday. My neck feels fine today but I havent been driving much. Driving tends to aggravate it. Pain Screening  Patient Currently in Pain: No (no pain post session)       Treatment Activities:            Exercises  Exercise 1: Cervical ROM rotation 10 reps each   Exercise 2: Chin tucks  x 10  Exercise 3: Backward shoulder rolls x 15  Exercise 4: Scapular retraction x 15  Exercise 5: Supine on thoracic pad ( towel roll until thoracic pad tolerated)  x   3'  Exercise 6: L Upper trap stretch--5 reps, 10 second hold  Exercise 7: L levator scap stretch--5 reps, 10 second hold  Exercise 8: Corner stretch--5 reps,10 sec  Exercise 9: Rolling theraball up wall for thoracic extension (red) 5\" x 10     Exercise 10: L shoulder theraband extension and abduction--red band, 10/10     Exercise 11: ICT x 20 minutes 23#   Exercise 12: Estim + HP if higher levels of pain--not today  Exercise 13: 18: HEP ISSUED            Assessment:   Conditions Requiring Skilled Therapeutic Intervention  Body structures, Functions, Activity limitations: Decreased functional mobility ; Decreased ADL status; Decreased strength;Decreased high-level IADLs  Assessment: Patient did well with session today.   Reports she has been

## 2018-12-17 ENCOUNTER — HOSPITAL ENCOUNTER (OUTPATIENT)
Dept: PHYSICAL THERAPY | Age: 68
Setting detail: THERAPIES SERIES
Discharge: HOME | End: 2018-12-17
Payer: COMMERCIAL

## 2018-12-17 PROCEDURE — 97012 MECHANICAL TRACTION THERAPY: CPT

## 2018-12-17 PROCEDURE — 97110 THERAPEUTIC EXERCISES: CPT

## 2018-12-19 ENCOUNTER — HOSPITAL ENCOUNTER (OUTPATIENT)
Dept: PHYSICAL THERAPY | Age: 68
Setting detail: THERAPIES SERIES
Discharge: HOME | End: 2018-12-19
Payer: COMMERCIAL

## 2018-12-19 PROCEDURE — 97110 THERAPEUTIC EXERCISES: CPT

## 2018-12-19 PROCEDURE — 97012 MECHANICAL TRACTION THERAPY: CPT

## 2018-12-24 ENCOUNTER — HOSPITAL ENCOUNTER (OUTPATIENT)
Dept: PHYSICAL THERAPY | Age: 68
Setting detail: THERAPIES SERIES
End: 2018-12-24
Payer: COMMERCIAL

## 2018-12-27 ENCOUNTER — APPOINTMENT (OUTPATIENT)
Dept: PHYSICAL THERAPY | Age: 68
End: 2018-12-27
Payer: COMMERCIAL

## 2019-01-09 ENCOUNTER — HOSPITAL ENCOUNTER (OUTPATIENT)
Dept: PHYSICAL THERAPY | Age: 69
Setting detail: THERAPIES SERIES
Discharge: HOME | End: 2019-01-09

## 2019-01-09 PROCEDURE — G8982 BODY POS GOAL STATUS: HCPCS

## 2019-01-09 PROCEDURE — G8983 BODY POS D/C STATUS: HCPCS

## 2019-01-09 PROCEDURE — 97012 MECHANICAL TRACTION THERAPY: CPT

## 2019-01-09 PROCEDURE — 97110 THERAPEUTIC EXERCISES: CPT

## 2019-01-22 ENCOUNTER — LAB (OUTPATIENT)
Dept: LAB | Facility: HOSPITAL | Age: 69
End: 2019-01-22
Attending: FAMILY MEDICINE

## 2019-01-22 ENCOUNTER — TRANSCRIBE ORDERS (OUTPATIENT)
Dept: LAB | Facility: HOSPITAL | Age: 69
End: 2019-01-22

## 2019-01-22 DIAGNOSIS — I10 PRIMARY HYPERTENSION: Primary | ICD-10-CM

## 2019-01-22 DIAGNOSIS — E78.5 HYPERLIPIDEMIA, UNSPECIFIED HYPERLIPIDEMIA TYPE: ICD-10-CM

## 2019-01-22 LAB
ALBUMIN SERPL-MCNC: 4.5 G/DL (ref 3.5–5)
ALBUMIN/GLOB SERPL: 1.7 G/DL (ref 1.1–2.5)
ALP SERPL-CCNC: 107 U/L (ref 24–120)
ALT SERPL W P-5'-P-CCNC: 24 U/L (ref 0–54)
ANION GAP SERPL CALCULATED.3IONS-SCNC: 10 MMOL/L (ref 4–13)
ARTICHOKE IGE QN: 103 MG/DL (ref 0–99)
AST SERPL-CCNC: 28 U/L (ref 7–45)
BILIRUB SERPL-MCNC: 0.5 MG/DL (ref 0.1–1)
BUN BLD-MCNC: 18 MG/DL (ref 5–21)
BUN/CREAT SERPL: 28.1 (ref 7–25)
CALCIUM SPEC-SCNC: 9.4 MG/DL (ref 8.4–10.4)
CHLORIDE SERPL-SCNC: 101 MMOL/L (ref 98–110)
CHOLEST SERPL-MCNC: 171 MG/DL (ref 130–200)
CO2 SERPL-SCNC: 29 MMOL/L (ref 24–31)
CREAT BLD-MCNC: 0.64 MG/DL (ref 0.5–1.4)
DEPRECATED RDW RBC AUTO: 37.3 FL (ref 40–54)
ERYTHROCYTE [DISTWIDTH] IN BLOOD BY AUTOMATED COUNT: 11.5 % (ref 12–15)
GFR SERPL CREATININE-BSD FRML MDRD: 92 ML/MIN/1.73
GLOBULIN UR ELPH-MCNC: 2.6 GM/DL
GLUCOSE BLD-MCNC: 146 MG/DL (ref 70–100)
HBA1C MFR BLD: 7.1 %
HCT VFR BLD AUTO: 42.8 % (ref 37–47)
HDLC SERPL-MCNC: 47 MG/DL
HGB BLD-MCNC: 15.1 G/DL (ref 12–16)
LDLC/HDLC SERPL: 2.03 {RATIO}
MCH RBC QN AUTO: 31.5 PG (ref 28–32)
MCHC RBC AUTO-ENTMCNC: 35.3 G/DL (ref 33–36)
MCV RBC AUTO: 89.4 FL (ref 82–98)
PLATELET # BLD AUTO: 263 10*3/MM3 (ref 130–400)
PMV BLD AUTO: 9.8 FL (ref 6–12)
POTASSIUM BLD-SCNC: 4.1 MMOL/L (ref 3.5–5.3)
PROT SERPL-MCNC: 7.1 G/DL (ref 6.3–8.7)
RBC # BLD AUTO: 4.79 10*6/MM3 (ref 4.2–5.4)
SODIUM BLD-SCNC: 140 MMOL/L (ref 135–145)
T4 FREE SERPL-MCNC: 1 NG/DL (ref 0.78–2.19)
TRIGL SERPL-MCNC: 142 MG/DL (ref 0–149)
TSH SERPL DL<=0.05 MIU/L-ACNC: 2.02 MIU/ML (ref 0.47–4.68)
URATE SERPL-MCNC: 5.9 MG/DL (ref 2.7–7.5)
WBC NRBC COR # BLD: 7.42 10*3/MM3 (ref 4.8–10.8)

## 2019-01-22 PROCEDURE — 80061 LIPID PANEL: CPT | Performed by: FAMILY MEDICINE

## 2019-01-22 PROCEDURE — 84439 ASSAY OF FREE THYROXINE: CPT | Performed by: FAMILY MEDICINE

## 2019-01-22 PROCEDURE — 84550 ASSAY OF BLOOD/URIC ACID: CPT | Performed by: FAMILY MEDICINE

## 2019-01-22 PROCEDURE — 85027 COMPLETE CBC AUTOMATED: CPT | Performed by: FAMILY MEDICINE

## 2019-01-22 PROCEDURE — 83036 HEMOGLOBIN GLYCOSYLATED A1C: CPT | Performed by: FAMILY MEDICINE

## 2019-01-22 PROCEDURE — 84481 FREE ASSAY (FT-3): CPT | Performed by: FAMILY MEDICINE

## 2019-01-22 PROCEDURE — 80053 COMPREHEN METABOLIC PANEL: CPT | Performed by: FAMILY MEDICINE

## 2019-01-22 PROCEDURE — 82570 ASSAY OF URINE CREATININE: CPT | Performed by: FAMILY MEDICINE

## 2019-01-22 PROCEDURE — 36415 COLL VENOUS BLD VENIPUNCTURE: CPT | Performed by: FAMILY MEDICINE

## 2019-01-22 PROCEDURE — 82043 UR ALBUMIN QUANTITATIVE: CPT | Performed by: FAMILY MEDICINE

## 2019-01-22 PROCEDURE — 84443 ASSAY THYROID STIM HORMONE: CPT | Performed by: FAMILY MEDICINE

## 2019-01-23 LAB
CREAT 24H UR-MCNC: 92.4 MG/DL
MICROALBUMIN UR-MCNC: 3.6 UG/ML
MICROALBUMIN/CREAT UR: 3.9 MG/G CREAT (ref 0–30)
T3FREE SERPL-MCNC: 3.1 PG/ML (ref 2–4.4)

## 2019-02-05 ENCOUNTER — TRANSCRIBE ORDERS (OUTPATIENT)
Dept: ADMINISTRATIVE | Facility: HOSPITAL | Age: 69
End: 2019-02-05

## 2019-02-05 DIAGNOSIS — Z00.00 ENCOUNTER FOR GENERAL ADULT MEDICAL EXAMINATION WITHOUT ABNORMAL FINDINGS: Primary | ICD-10-CM

## 2019-02-05 DIAGNOSIS — I10 ESSENTIAL (PRIMARY) HYPERTENSION: ICD-10-CM

## 2019-02-05 DIAGNOSIS — E11.65 TYPE 2 DIABETES MELLITUS WITH HYPERGLYCEMIA, UNSPECIFIED WHETHER LONG TERM INSULIN USE (HCC): ICD-10-CM

## 2019-02-05 DIAGNOSIS — E78.5 HYPERLIPIDEMIA, UNSPECIFIED HYPERLIPIDEMIA TYPE: ICD-10-CM

## 2019-05-22 ENCOUNTER — TRANSCRIBE ORDERS (OUTPATIENT)
Dept: ADMINISTRATIVE | Facility: HOSPITAL | Age: 69
End: 2019-05-22

## 2019-05-22 ENCOUNTER — LAB (OUTPATIENT)
Dept: LAB | Facility: HOSPITAL | Age: 69
End: 2019-05-22

## 2019-05-22 DIAGNOSIS — E11.65 TYPE 2 DIABETES MELLITUS WITH HYPERGLYCEMIA, UNSPECIFIED WHETHER LONG TERM INSULIN USE (HCC): ICD-10-CM

## 2019-05-22 DIAGNOSIS — E78.5 HYPERLIPIDEMIA, UNSPECIFIED HYPERLIPIDEMIA TYPE: ICD-10-CM

## 2019-05-22 DIAGNOSIS — F33.1 MAJOR DEPRESSIVE DISORDER, RECURRENT, MODERATE (HCC): ICD-10-CM

## 2019-05-22 DIAGNOSIS — Z12.39 ENCOUNTER FOR OTHER SCREENING FOR MALIGNANT NEOPLASM OF BREAST: ICD-10-CM

## 2019-05-22 DIAGNOSIS — Z12.39 SCREENING BREAST EXAMINATION: Primary | ICD-10-CM

## 2019-05-22 DIAGNOSIS — I10 ESSENTIAL (PRIMARY) HYPERTENSION: ICD-10-CM

## 2019-05-22 DIAGNOSIS — Z00.00 ENCOUNTER FOR GENERAL ADULT MEDICAL EXAMINATION WITHOUT ABNORMAL FINDINGS: ICD-10-CM

## 2019-05-22 DIAGNOSIS — E11.69 TYPE 2 DIABETES MELLITUS WITH OTHER SPECIFIED COMPLICATION, UNSPECIFIED WHETHER LONG TERM INSULIN USE (HCC): ICD-10-CM

## 2019-05-22 DIAGNOSIS — Z12.4 ENCOUNTER FOR SCREENING FOR MALIGNANT NEOPLASM OF CERVIX: Primary | ICD-10-CM

## 2019-05-22 LAB
ALBUMIN SERPL-MCNC: 4.4 G/DL (ref 3.5–5)
ALBUMIN/GLOB SERPL: 1.5 G/DL (ref 1.1–2.5)
ALP SERPL-CCNC: 117 U/L (ref 24–120)
ALT SERPL W P-5'-P-CCNC: 20 U/L (ref 0–54)
ANION GAP SERPL CALCULATED.3IONS-SCNC: 7 MMOL/L (ref 4–13)
ARTICHOKE IGE QN: 99 MG/DL (ref 0–99)
AST SERPL-CCNC: 30 U/L (ref 7–45)
BILIRUB SERPL-MCNC: 0.5 MG/DL (ref 0.1–1)
BUN BLD-MCNC: 21 MG/DL (ref 5–21)
BUN/CREAT SERPL: 31.8 (ref 7–25)
CALCIUM SPEC-SCNC: 9.4 MG/DL (ref 8.4–10.4)
CHLORIDE SERPL-SCNC: 104 MMOL/L (ref 98–110)
CHOLEST SERPL-MCNC: 172 MG/DL (ref 130–200)
CO2 SERPL-SCNC: 30 MMOL/L (ref 24–31)
CREAT BLD-MCNC: 0.66 MG/DL (ref 0.5–1.4)
DEPRECATED RDW RBC AUTO: 37.6 FL (ref 40–54)
ERYTHROCYTE [DISTWIDTH] IN BLOOD BY AUTOMATED COUNT: 11.8 % (ref 12–15)
GFR SERPL CREATININE-BSD FRML MDRD: 89 ML/MIN/1.73
GLOBULIN UR ELPH-MCNC: 2.9 GM/DL
GLUCOSE BLD-MCNC: 142 MG/DL (ref 70–100)
HBA1C MFR BLD: 6.8 %
HCT VFR BLD AUTO: 41.1 % (ref 37–47)
HDLC SERPL-MCNC: 50 MG/DL
HGB BLD-MCNC: 14.1 G/DL (ref 12–16)
LDLC/HDLC SERPL: 1.92 {RATIO}
MCH RBC QN AUTO: 30.5 PG (ref 28–32)
MCHC RBC AUTO-ENTMCNC: 34.3 G/DL (ref 33–36)
MCV RBC AUTO: 88.8 FL (ref 82–98)
PLATELET # BLD AUTO: 263 10*3/MM3 (ref 130–400)
PMV BLD AUTO: 9.7 FL (ref 6–12)
POTASSIUM BLD-SCNC: 4.3 MMOL/L (ref 3.5–5.3)
PROT SERPL-MCNC: 7.3 G/DL (ref 6.3–8.7)
RBC # BLD AUTO: 4.63 10*6/MM3 (ref 4.2–5.4)
SODIUM BLD-SCNC: 141 MMOL/L (ref 135–145)
T4 FREE SERPL-MCNC: 0.88 NG/DL (ref 0.78–2.19)
TRIGL SERPL-MCNC: 129 MG/DL (ref 0–149)
TSH SERPL DL<=0.05 MIU/L-ACNC: 2.63 MIU/ML (ref 0.47–4.68)
URATE SERPL-MCNC: 5.6 MG/DL (ref 2.7–7.5)
WBC NRBC COR # BLD: 7.3 10*3/MM3 (ref 4.8–10.8)

## 2019-05-22 PROCEDURE — 84480 ASSAY TRIIODOTHYRONINE (T3): CPT | Performed by: FAMILY MEDICINE

## 2019-05-22 PROCEDURE — 82570 ASSAY OF URINE CREATININE: CPT | Performed by: FAMILY MEDICINE

## 2019-05-22 PROCEDURE — 84550 ASSAY OF BLOOD/URIC ACID: CPT | Performed by: FAMILY MEDICINE

## 2019-05-22 PROCEDURE — 85027 COMPLETE CBC AUTOMATED: CPT | Performed by: FAMILY MEDICINE

## 2019-05-22 PROCEDURE — 83036 HEMOGLOBIN GLYCOSYLATED A1C: CPT | Performed by: FAMILY MEDICINE

## 2019-05-22 PROCEDURE — 84439 ASSAY OF FREE THYROXINE: CPT | Performed by: FAMILY MEDICINE

## 2019-05-22 PROCEDURE — 80061 LIPID PANEL: CPT | Performed by: FAMILY MEDICINE

## 2019-05-22 PROCEDURE — 80053 COMPREHEN METABOLIC PANEL: CPT | Performed by: FAMILY MEDICINE

## 2019-05-22 PROCEDURE — 82043 UR ALBUMIN QUANTITATIVE: CPT | Performed by: FAMILY MEDICINE

## 2019-05-22 PROCEDURE — 36415 COLL VENOUS BLD VENIPUNCTURE: CPT

## 2019-05-22 PROCEDURE — 84443 ASSAY THYROID STIM HORMONE: CPT | Performed by: FAMILY MEDICINE

## 2019-05-23 ENCOUNTER — HOSPITAL ENCOUNTER (OUTPATIENT)
Dept: MAMMOGRAPHY | Facility: HOSPITAL | Age: 69
Discharge: HOME | End: 2019-05-23
Admitting: FAMILY MEDICINE

## 2019-05-23 LAB
CREAT 24H UR-MCNC: 84.7 MG/DL
MICROALBUMIN UR-MCNC: 3 UG/ML
MICROALBUMIN/CREAT UR: 3.5 MG/G CREAT (ref 0–30)
T3 SERPL-MCNC: 132 NG/DL (ref 71–180)

## 2019-05-23 PROCEDURE — 77067 SCR MAMMO BI INCL CAD: CPT

## 2019-05-23 PROCEDURE — 77063 BREAST TOMOSYNTHESIS BI: CPT

## 2019-08-23 ENCOUNTER — TRANSCRIBE ORDERS (OUTPATIENT)
Dept: ADMINISTRATIVE | Facility: HOSPITAL | Age: 69
End: 2019-08-23

## 2019-08-23 DIAGNOSIS — E11.65 TYPE 2 DIABETES MELLITUS WITH HYPERGLYCEMIA, UNSPECIFIED WHETHER LONG TERM INSULIN USE (HCC): Primary | ICD-10-CM

## 2019-08-26 ENCOUNTER — LAB (OUTPATIENT)
Dept: LAB | Facility: HOSPITAL | Age: 69
End: 2019-08-26

## 2019-08-26 DIAGNOSIS — E11.65 TYPE 2 DIABETES MELLITUS WITH HYPERGLYCEMIA, UNSPECIFIED WHETHER LONG TERM INSULIN USE (HCC): ICD-10-CM

## 2019-08-26 LAB
ALBUMIN SERPL-MCNC: 4.1 G/DL (ref 3.5–5)
ALBUMIN/GLOB SERPL: 1.4 G/DL (ref 1.1–2.5)
ALP SERPL-CCNC: 100 U/L (ref 24–120)
ALT SERPL W P-5'-P-CCNC: 27 U/L (ref 0–54)
ANION GAP SERPL CALCULATED.3IONS-SCNC: 5 MMOL/L (ref 4–13)
AST SERPL-CCNC: 26 U/L (ref 7–45)
BILIRUB SERPL-MCNC: 0.4 MG/DL (ref 0.1–1)
BUN BLD-MCNC: 16 MG/DL (ref 5–21)
BUN/CREAT SERPL: 24.2 (ref 7–25)
CALCIUM SPEC-SCNC: 9.4 MG/DL (ref 8.4–10.4)
CHLORIDE SERPL-SCNC: 104 MMOL/L (ref 98–110)
CO2 SERPL-SCNC: 30 MMOL/L (ref 24–31)
CREAT BLD-MCNC: 0.66 MG/DL (ref 0.5–1.4)
GFR SERPL CREATININE-BSD FRML MDRD: 89 ML/MIN/1.73
GLOBULIN UR ELPH-MCNC: 2.9 GM/DL
GLUCOSE BLD-MCNC: 150 MG/DL (ref 70–100)
HBA1C MFR BLD: 7.1 % (ref 4.8–5.9)
POTASSIUM BLD-SCNC: 4.3 MMOL/L (ref 3.5–5.3)
PROT SERPL-MCNC: 7 G/DL (ref 6.3–8.7)
SODIUM BLD-SCNC: 139 MMOL/L (ref 135–145)

## 2019-08-26 PROCEDURE — 83036 HEMOGLOBIN GLYCOSYLATED A1C: CPT | Performed by: FAMILY MEDICINE

## 2019-08-26 PROCEDURE — 36415 COLL VENOUS BLD VENIPUNCTURE: CPT

## 2019-08-26 PROCEDURE — 80053 COMPREHEN METABOLIC PANEL: CPT | Performed by: FAMILY MEDICINE

## 2019-11-21 ENCOUNTER — TELEPHONE (OUTPATIENT)
Dept: VASCULAR SURGERY | Facility: CLINIC | Age: 69
End: 2019-11-21

## 2019-11-21 NOTE — TELEPHONE ENCOUNTER
Spoke with Mrs Conte reminding her of her appointments for Friday, November 22nd, 2019. Reminded [facility]30 [facility] for testing and follow up afterwards at 11 am with Yancy KATE. Mrs Conte confirmed she would be here.

## 2019-11-22 ENCOUNTER — HOSPITAL ENCOUNTER (OUTPATIENT)
Dept: ULTRASOUND IMAGING | Facility: HOSPITAL | Age: 69
Discharge: HOME | End: 2019-11-22
Admitting: NURSE PRACTITIONER

## 2019-11-22 ENCOUNTER — OFFICE VISIT (OUTPATIENT)
Dept: VASCULAR SURGERY | Facility: CLINIC | Age: 69
End: 2019-11-22

## 2019-11-22 VITALS
HEART RATE: 76 BPM | HEIGHT: 63 IN | OXYGEN SATURATION: 97 % | DIASTOLIC BLOOD PRESSURE: 80 MMHG | WEIGHT: 158 LBS | SYSTOLIC BLOOD PRESSURE: 126 MMHG | BODY MASS INDEX: 28 KG/M2

## 2019-11-22 DIAGNOSIS — I87.2 VENOUS (PERIPHERAL) INSUFFICIENCY: ICD-10-CM

## 2019-11-22 DIAGNOSIS — I65.23 BILATERAL CAROTID ARTERY STENOSIS: Primary | ICD-10-CM

## 2019-11-22 DIAGNOSIS — I65.23 BILATERAL CAROTID ARTERY STENOSIS: ICD-10-CM

## 2019-11-22 PROCEDURE — 99214 OFFICE O/P EST MOD 30 MIN: CPT | Performed by: NURSE PRACTITIONER

## 2019-11-22 PROCEDURE — 93880 EXTRACRANIAL BILAT STUDY: CPT

## 2019-11-22 PROCEDURE — 93880 EXTRACRANIAL BILAT STUDY: CPT | Performed by: SURGERY

## 2019-11-22 NOTE — PROGRESS NOTES
"11/22/2019       Komal Laws,   [address]  [place] [address]        Kaelyn Conte  1950    Chief Complaint   Patient presents with   • Follow-up     1 Year Follow UP For Bilateral Carotid Artery Stenosis. Test 508473 US pad carotid bilateral. Patient denies any stroke like symptoms.        Dear Komal Laws DO:    HPI     I had the pleasure of seeing you patient in the office today for follow up.  As you recall, the patient is a 69 y.o. female who we are currently following for asymptomatic carotid occlusive disease.  She continues to do well and denies any strokelike symptoms or claudicating symptoms to her lower extremities. She has some pain in her toes at night, but this is unchanged.  She is maintained on aspirin.  She did have noninvasive testing performed today, which I did review in office.      Review of Systems   Constitutional: Negative.    HENT: Negative.    Eyes: Negative.    Respiratory: Negative.    Cardiovascular: Negative.    Gastrointestinal: Negative.    Endocrine: Negative.    Genitourinary: Negative.    Musculoskeletal: Negative.    Skin: Negative.    Allergic/Immunologic: Negative.    Neurological: Negative.    Hematological: Negative.    Psychiatric/Behavioral: Negative.        /80 (BP Location: Right arm, Patient Position: Sitting, Cuff Size: Adult)   Pulse 76   Ht 160 cm (63\")   Wt 71.7 kg (158 lb)   LMP  (LMP Unknown)   SpO2 97%   BMI 27.99 kg/m²   Physical Exam   Constitutional: She is oriented to person, place, and time. Vital signs are normal. She appears well-developed and well-nourished.   HENT:   Head: Normocephalic and atraumatic.   Eyes: Pupils are equal, round, and reactive to light.   Neck: Neck supple. No JVD present. Carotid bruit is not present. No thyromegaly present.   Cardiovascular: Normal rate, regular rhythm, normal heart sounds and intact distal pulses.   Pulses:       Carotid pulses are 2+ on the right side, and " 2+ on the left side.       Femoral pulses are 2+ on the right side, and 2+ on the left side.       Popliteal pulses are 2+ on the right side, and 2+ on the left side.        Dorsalis pedis pulses are 2+ on the right side, and 2+ on the left side.        Posterior tibial pulses are 2+ on the right side, and 2+ on the left side.   Pulmonary/Chest: Effort normal and breath sounds normal.   Abdominal: Soft. Bowel sounds are normal. She exhibits no distension, no abdominal bruit and no mass. There is no hepatosplenomegaly. There is no tenderness.   Musculoskeletal: Normal range of motion. She exhibits no edema.   Neurological: She is alert and oriented to person, place, and time. She has normal strength. No cranial nerve deficit or sensory deficit.   Skin: Skin is warm and intact.   Psychiatric: She has a normal mood and affect. Her behavior is normal. Judgment and thought content normal.   Nursing note and vitals reviewed.      DIAGNOSTIC DATA:  Us Carotid Bilateral    Result Date: 11/22/2019  Narrative: History: Carotid occlusive disease      Impression: Impression: 1. There is less than 50% stenosis of the right internal carotid artery. 2. There is less than 50% stenosis of the left internal carotid artery. 3. Antegrade flow is demonstrated in bilateral vertebral arteries.  Comments: Bilateral carotid vertebral arterial duplex scan was performed.  Grayscale imaging shows intimal thickening and calcified elements at the carotid bifurcation. The right internal carotid artery peak systolic velocity is 73.9 cm/sec. The end-diastolic velocity is 24.1 cm/sec. The right ICA/CCA ratio is approximately 0.64 . These findings correlate with less than 50% stenosis of the right internal carotid artery.  Grayscale imaging shows intimal thickening and calcified elements at the carotid bifurcation. The left internal carotid artery peak systolic velocity is 96.7 cm/sec. The end-diastolic velocity is 36.9 cm/sec. The left ICA/CCA ratio  is approximately 0.71 . These findings correlate with less than 50% stenosis of the left internal carotid artery.  Antegrade flow is demonstrated in bilateral vertebral arteries. There is greater than 50% stenosis of the left common carotid artery. This report was finalized on 11/22/2019 15:26 by Dr. Silvio Macias MD.         Patient Active Problem List   Diagnosis   • Diabetes mellitus (CMS/HCC)   • Carotid stenosis, asymptomatic   • Venous (peripheral) insufficiency   • Hx of colonic polyps   • Family history of colon cancer         ICD-10-CM ICD-9-CM   1. Bilateral carotid artery stenosis I65.23 433.10     433.30   2. Venous (peripheral) insufficiency I87.2 459.81       PLAN: After thoroughly evaluating Kaelyn Conte, I believe the best course of action is to remain conservative from vascular standpoint.  I did review her testing which remains less than 50% stenosis bilaterally.  She continues to do well and denies any strokelike symptoms.  We will see her back in 1 year with repeat noninvasive testing for continued surveillance, including a carotid duplex.  I also encouraged her to continue to wear her compression stockings on a daily basis and to keep her legs elevated when she is not on them.  I did discuss vascular risk factors as they pertain to the progression of vascular disease including controlling her diabetes mellitus and hypertension, which appears stable.  Body mass index is 27.99 kg/m².  The patient is to continue taking their medications as previously discussed.   This was all discussed in full with complete understanding.    Thank you for allowing me to participate in the care of your patient.  Please do not hesitate to call with any questions or concerns.  We will keep you aware of any further encounters with Kaelyn Conte.      Sincerely Yours,      HUMBLE Gu

## 2020-01-27 ENCOUNTER — APPOINTMENT (OUTPATIENT)
Dept: LAB | Facility: HOSPITAL | Age: 70
End: 2020-01-27

## 2020-01-27 ENCOUNTER — TRANSCRIBE ORDERS (OUTPATIENT)
Dept: ADMINISTRATIVE | Facility: HOSPITAL | Age: 70
End: 2020-01-27

## 2020-01-27 DIAGNOSIS — E11.69 TYPE 2 DIABETES MELLITUS WITH OTHER SPECIFIED COMPLICATION, UNSPECIFIED WHETHER LONG TERM INSULIN USE (HCC): Primary | ICD-10-CM

## 2020-01-27 DIAGNOSIS — E78.5 HYPERLIPIDEMIA, UNSPECIFIED HYPERLIPIDEMIA TYPE: ICD-10-CM

## 2020-01-27 DIAGNOSIS — I10 ESSENTIAL (PRIMARY) HYPERTENSION: ICD-10-CM

## 2020-01-27 LAB
ALBUMIN SERPL-MCNC: 4.3 G/DL (ref 3.5–5.2)
ALBUMIN/GLOB SERPL: 1.5 G/DL
ALP SERPL-CCNC: 101 U/L (ref 39–117)
ALT SERPL W P-5'-P-CCNC: 15 U/L (ref 1–33)
ANION GAP SERPL CALCULATED.3IONS-SCNC: 13.3 MMOL/L (ref 5–15)
AST SERPL-CCNC: 16 U/L (ref 1–32)
BILIRUB SERPL-MCNC: 0.3 MG/DL (ref 0.2–1.2)
BUN BLD-MCNC: 17 MG/DL (ref 8–23)
BUN/CREAT SERPL: 25.4 (ref 7–25)
CALCIUM SPEC-SCNC: 9.3 MG/DL (ref 8.6–10.5)
CHLORIDE SERPL-SCNC: 101 MMOL/L (ref 98–107)
CHOLEST SERPL-MCNC: 177 MG/DL (ref 0–200)
CO2 SERPL-SCNC: 27.7 MMOL/L (ref 22–29)
CREAT BLD-MCNC: 0.67 MG/DL (ref 0.57–1)
GFR SERPL CREATININE-BSD FRML MDRD: 87 ML/MIN/1.73
GLOBULIN UR ELPH-MCNC: 2.8 GM/DL
GLUCOSE BLD-MCNC: 154 MG/DL (ref 65–99)
HBA1C MFR BLD: 7.27 % (ref 4.8–5.6)
HDLC SERPL-MCNC: 49 MG/DL (ref 40–60)
LDLC SERPL CALC-MCNC: 90 MG/DL (ref 0–100)
LDLC/HDLC SERPL: 1.83 {RATIO}
POTASSIUM BLD-SCNC: 4.4 MMOL/L (ref 3.5–5.2)
PROT SERPL-MCNC: 7.1 G/DL (ref 6–8.5)
SODIUM BLD-SCNC: 142 MMOL/L (ref 136–145)
TRIGL SERPL-MCNC: 192 MG/DL (ref 0–150)
VLDLC SERPL-MCNC: 38.4 MG/DL (ref 5–40)

## 2020-01-27 PROCEDURE — 83036 HEMOGLOBIN GLYCOSYLATED A1C: CPT | Performed by: FAMILY MEDICINE

## 2020-01-27 PROCEDURE — 36415 COLL VENOUS BLD VENIPUNCTURE: CPT | Performed by: FAMILY MEDICINE

## 2020-01-27 PROCEDURE — 80053 COMPREHEN METABOLIC PANEL: CPT | Performed by: FAMILY MEDICINE

## 2020-01-27 PROCEDURE — 80061 LIPID PANEL: CPT | Performed by: FAMILY MEDICINE

## 2020-07-01 ENCOUNTER — TRANSCRIBE ORDERS (OUTPATIENT)
Dept: ADMINISTRATIVE | Facility: HOSPITAL | Age: 70
End: 2020-07-01

## 2020-07-01 DIAGNOSIS — Z12.31 SCREENING MAMMOGRAM FOR HIGH-RISK PATIENT: Primary | ICD-10-CM

## 2020-07-14 ENCOUNTER — HOSPITAL ENCOUNTER (OUTPATIENT)
Dept: MAMMOGRAPHY | Facility: HOSPITAL | Age: 70
Discharge: HOME | End: 2020-07-14
Admitting: FAMILY MEDICINE

## 2020-07-14 PROCEDURE — 77063 BREAST TOMOSYNTHESIS BI: CPT

## 2020-07-14 PROCEDURE — 77067 SCR MAMMO BI INCL CAD: CPT

## 2020-07-21 ENCOUNTER — LAB (OUTPATIENT)
Dept: LAB | Facility: HOSPITAL | Age: 70
End: 2020-07-21

## 2020-07-21 ENCOUNTER — TRANSCRIBE ORDERS (OUTPATIENT)
Dept: ADMINISTRATIVE | Facility: HOSPITAL | Age: 70
End: 2020-07-21

## 2020-07-21 DIAGNOSIS — E78.5 HYPERLIPIDEMIA, UNSPECIFIED HYPERLIPIDEMIA TYPE: ICD-10-CM

## 2020-07-21 DIAGNOSIS — E11.69 DIABETES MELLITUS ASSOCIATED WITH HORMONAL ETIOLOGY (HCC): Primary | ICD-10-CM

## 2020-07-21 DIAGNOSIS — E11.69 DIABETES MELLITUS ASSOCIATED WITH HORMONAL ETIOLOGY (HCC): ICD-10-CM

## 2020-07-21 LAB
ALBUMIN SERPL-MCNC: 4.3 G/DL (ref 3.5–5.2)
ALBUMIN UR-MCNC: <1.2 MG/DL
ALBUMIN/GLOB SERPL: 1.6 G/DL
ALP SERPL-CCNC: 89 U/L (ref 39–117)
ALT SERPL W P-5'-P-CCNC: 15 U/L (ref 1–33)
ANION GAP SERPL CALCULATED.3IONS-SCNC: 12.3 MMOL/L (ref 5–15)
AST SERPL-CCNC: 18 U/L (ref 1–32)
BASOPHILS # BLD AUTO: 0.04 10*3/MM3 (ref 0–0.2)
BASOPHILS NFR BLD AUTO: 0.6 % (ref 0–1.5)
BILIRUB SERPL-MCNC: 0.4 MG/DL (ref 0–1.2)
BUN SERPL-MCNC: 18 MG/DL (ref 8–23)
BUN/CREAT SERPL: 26.5 (ref 7–25)
CALCIUM SPEC-SCNC: 9.5 MG/DL (ref 8.6–10.5)
CHLORIDE SERPL-SCNC: 102 MMOL/L (ref 98–107)
CHOLEST SERPL-MCNC: 182 MG/DL (ref 0–200)
CO2 SERPL-SCNC: 25.7 MMOL/L (ref 22–29)
CREAT SERPL-MCNC: 0.68 MG/DL (ref 0.57–1)
CREAT UR-MCNC: 156.8 MG/DL
DEPRECATED RDW RBC AUTO: 42.3 FL (ref 37–54)
EOSINOPHIL # BLD AUTO: 0.05 10*3/MM3 (ref 0–0.4)
EOSINOPHIL NFR BLD AUTO: 0.8 % (ref 0.3–6.2)
ERYTHROCYTE [DISTWIDTH] IN BLOOD BY AUTOMATED COUNT: 12.2 % (ref 12.3–15.4)
GFR SERPL CREATININE-BSD FRML MDRD: 86 ML/MIN/1.73
GLOBULIN UR ELPH-MCNC: 2.7 GM/DL
GLUCOSE SERPL-MCNC: 127 MG/DL (ref 65–99)
HBA1C MFR BLD: 7.1 % (ref 4.8–5.6)
HCT VFR BLD AUTO: 42.8 % (ref 34–46.6)
HDLC SERPL-MCNC: 49 MG/DL (ref 40–60)
HGB BLD-MCNC: 14.1 G/DL (ref 12–15.9)
IMM GRANULOCYTES # BLD AUTO: 0.02 10*3/MM3 (ref 0–0.05)
IMM GRANULOCYTES NFR BLD AUTO: 0.3 % (ref 0–0.5)
LDLC SERPL CALC-MCNC: 109 MG/DL (ref 0–100)
LDLC/HDLC SERPL: 2.23 {RATIO}
LYMPHOCYTES # BLD AUTO: 1.71 10*3/MM3 (ref 0.7–3.1)
LYMPHOCYTES NFR BLD AUTO: 25.9 % (ref 19.6–45.3)
MCH RBC QN AUTO: 31 PG (ref 26.6–33)
MCHC RBC AUTO-ENTMCNC: 32.9 G/DL (ref 31.5–35.7)
MCV RBC AUTO: 94.1 FL (ref 79–97)
MICROALBUMIN/CREAT UR: NORMAL MG/G{CREAT}
MONOCYTES # BLD AUTO: 0.42 10*3/MM3 (ref 0.1–0.9)
MONOCYTES NFR BLD AUTO: 6.4 % (ref 5–12)
NEUTROPHILS NFR BLD AUTO: 4.35 10*3/MM3 (ref 1.7–7)
NEUTROPHILS NFR BLD AUTO: 66 % (ref 42.7–76)
NRBC BLD AUTO-RTO: 0 /100 WBC (ref 0–0.2)
PLATELET # BLD AUTO: 263 10*3/MM3 (ref 140–450)
PMV BLD AUTO: 10.2 FL (ref 6–12)
POTASSIUM SERPL-SCNC: 4.4 MMOL/L (ref 3.5–5.2)
PROT SERPL-MCNC: 7 G/DL (ref 6–8.5)
RBC # BLD AUTO: 4.55 10*6/MM3 (ref 3.77–5.28)
SODIUM SERPL-SCNC: 140 MMOL/L (ref 136–145)
T3FREE SERPL-MCNC: 2.97 PG/ML (ref 2–4.4)
T4 FREE SERPL-MCNC: 1.05 NG/DL (ref 0.93–1.7)
TRIGL SERPL-MCNC: 119 MG/DL (ref 0–150)
TSH SERPL DL<=0.05 MIU/L-ACNC: 1.64 UIU/ML (ref 0.27–4.2)
URATE SERPL-MCNC: 6 MG/DL (ref 2.4–5.7)
VLDLC SERPL-MCNC: 23.8 MG/DL (ref 5–40)
WBC # BLD AUTO: 6.59 10*3/MM3 (ref 3.4–10.8)

## 2020-07-21 PROCEDURE — 84439 ASSAY OF FREE THYROXINE: CPT | Performed by: FAMILY MEDICINE

## 2020-07-21 PROCEDURE — 80053 COMPREHEN METABOLIC PANEL: CPT | Performed by: FAMILY MEDICINE

## 2020-07-21 PROCEDURE — 84443 ASSAY THYROID STIM HORMONE: CPT | Performed by: FAMILY MEDICINE

## 2020-07-21 PROCEDURE — 85025 COMPLETE CBC W/AUTO DIFF WBC: CPT | Performed by: FAMILY MEDICINE

## 2020-07-21 PROCEDURE — 82570 ASSAY OF URINE CREATININE: CPT | Performed by: FAMILY MEDICINE

## 2020-07-21 PROCEDURE — 80061 LIPID PANEL: CPT | Performed by: FAMILY MEDICINE

## 2020-07-21 PROCEDURE — 36415 COLL VENOUS BLD VENIPUNCTURE: CPT

## 2020-07-21 PROCEDURE — 84481 FREE ASSAY (FT-3): CPT | Performed by: FAMILY MEDICINE

## 2020-07-21 PROCEDURE — 84550 ASSAY OF BLOOD/URIC ACID: CPT | Performed by: FAMILY MEDICINE

## 2020-07-21 PROCEDURE — 83036 HEMOGLOBIN GLYCOSYLATED A1C: CPT | Performed by: FAMILY MEDICINE

## 2020-07-21 PROCEDURE — 82043 UR ALBUMIN QUANTITATIVE: CPT | Performed by: FAMILY MEDICINE

## 2020-11-09 ENCOUNTER — TELEPHONE (OUTPATIENT)
Dept: VASCULAR SURGERY | Facility: CLINIC | Age: 70
End: 2020-11-09

## 2020-11-10 ENCOUNTER — HOSPITAL ENCOUNTER (OUTPATIENT)
Dept: ULTRASOUND IMAGING | Facility: HOSPITAL | Age: 70
Discharge: HOME | End: 2020-11-10
Admitting: NURSE PRACTITIONER

## 2020-11-10 ENCOUNTER — OFFICE VISIT (OUTPATIENT)
Dept: VASCULAR SURGERY | Facility: CLINIC | Age: 70
End: 2020-11-10

## 2020-11-10 VITALS
WEIGHT: 156 LBS | HEIGHT: 63 IN | HEART RATE: 65 BPM | DIASTOLIC BLOOD PRESSURE: 64 MMHG | OXYGEN SATURATION: 97 % | BODY MASS INDEX: 27.64 KG/M2 | SYSTOLIC BLOOD PRESSURE: 110 MMHG

## 2020-11-10 DIAGNOSIS — I65.23 BILATERAL CAROTID ARTERY STENOSIS: ICD-10-CM

## 2020-11-10 DIAGNOSIS — E11.9 TYPE 2 DIABETES MELLITUS WITHOUT COMPLICATION, WITHOUT LONG-TERM CURRENT USE OF INSULIN (HCC): ICD-10-CM

## 2020-11-10 DIAGNOSIS — I65.23 BILATERAL CAROTID ARTERY STENOSIS: Primary | ICD-10-CM

## 2020-11-10 PROCEDURE — 93880 EXTRACRANIAL BILAT STUDY: CPT

## 2020-11-10 PROCEDURE — 99214 OFFICE O/P EST MOD 30 MIN: CPT | Performed by: SURGERY

## 2020-11-10 PROCEDURE — 93880 EXTRACRANIAL BILAT STUDY: CPT | Performed by: SURGERY

## 2020-11-10 RX ORDER — LORATADINE 10 MG/1
10 TABLET ORAL DAILY
COMMUNITY
End: 2022-10-11

## 2020-11-10 RX ORDER — TRIAMTERENE CAPSULES 50 MG/1
50 CAPSULE ORAL 2 TIMES DAILY
COMMUNITY
End: 2022-10-11

## 2020-11-10 RX ORDER — FAMOTIDINE 10 MG
10 TABLET ORAL 2 TIMES DAILY PRN
COMMUNITY
End: 2022-10-11

## 2020-11-10 RX ORDER — ESCITALOPRAM OXALATE 10 MG/1
10 TABLET ORAL DAILY
COMMUNITY

## 2020-11-10 NOTE — PROGRESS NOTES
"11/10/2020       Komal Laws DO  [address]  [place] [address]        Kaelyn Conte  1950    Chief Complaint   Patient presents with   • Follow-up     1 Year Follow Up For Bilateral Carotid Artery Stenosis. Test [id number] US pad carotid bilateral. Patient denies any stroke like symptoms.    • Non-Smoker     Patient is a Non-Smoker   • Med Management     Verbally verified medications with patient.        Dear Komal Laws DO:    HPI     I had the pleasure of seeing you patient in the office today for follow up.  As you recall, the patient is a 70 y.o. female who we are currently following for asymptomatic carotid occlusive disease.  She continues to do well and denies any strokelike symptoms.  She does have some neuropathy to her toes however this is unchanged.  She denies any claudication to her lower extremities.  She is maintained on aspirin.  She did have noninvasive testing performed today, which I did review in office.      Review of Systems   Constitutional: Negative.    HENT: Negative.    Eyes: Negative.    Respiratory: Negative.    Cardiovascular: Negative.    Gastrointestinal: Negative.    Endocrine: Negative.    Genitourinary: Negative.    Musculoskeletal: Negative.    Skin: Negative.    Allergic/Immunologic: Negative.    Neurological: Negative.    Hematological: Negative.    Psychiatric/Behavioral: Negative.        /64 (BP Location: Right arm, Patient Position: Sitting, Cuff Size: Adult)   Pulse 65   Ht 160 cm (63\")   Wt 70.8 kg (156 lb)   LMP  (LMP Unknown)   SpO2 97%   BMI 27.63 kg/m²   Physical Exam  Vitals signs and nursing note reviewed.   Constitutional:       General: She is not in acute distress.     Appearance: Normal appearance. She is well-developed and normal weight. She is not diaphoretic.   HENT:      Head: Normocephalic and atraumatic.   Eyes:      General: No scleral icterus.     Pupils: Pupils are equal, round, and reactive to light. "   Neck:      Musculoskeletal: Normal range of motion and neck supple.      Thyroid: No thyromegaly.      Vascular: No carotid bruit or JVD.   Cardiovascular:      Rate and Rhythm: Normal rate and regular rhythm.      Pulses: Normal pulses.           Carotid pulses are 2+ on the right side and 2+ on the left side.       Femoral pulses are 2+ on the right side and 2+ on the left side.       Popliteal pulses are 2+ on the right side and 2+ on the left side.        Dorsalis pedis pulses are 2+ on the right side and 2+ on the left side.        Posterior tibial pulses are 2+ on the right side and 2+ on the left side.      Heart sounds: Normal heart sounds and S2 normal. No murmur. No friction rub. No gallop.    Pulmonary:      Effort: Pulmonary effort is normal.      Breath sounds: Normal breath sounds.   Abdominal:      General: Bowel sounds are normal. There is no distension or abdominal bruit.      Palpations: Abdomen is soft. There is no mass.      Tenderness: There is no abdominal tenderness.   Musculoskeletal: Normal range of motion.   Skin:     General: Skin is warm and dry.   Neurological:      General: No focal deficit present.      Mental Status: She is alert and oriented to person, place, and time.      Cranial Nerves: No cranial nerve deficit.      Sensory: No sensory deficit.   Psychiatric:         Mood and Affect: Mood normal.         Behavior: Behavior normal.         Thought Content: Thought content normal.         Judgment: Judgment normal.          DIAGNOSTIC DATA:  Noninvasive testing including a carotid duplex shows 50-69% right and less than 50% on the left with bilateral antegrade vertebal flow.       Patient Active Problem List   Diagnosis   • Diabetes mellitus (CMS/HCC)   • Carotid stenosis, asymptomatic   • Venous (peripheral) insufficiency   • Hx of colonic polyps   • Family history of colon cancer         ICD-10-CM ICD-9-CM   1. Bilateral carotid artery stenosis  I65.23 433.10     433.30   2.  Type 2 diabetes mellitus without complication, without long-term current use of insulin (CMS/[facility])  E11.9 250.00       PLAN: After thoroughly evaluating Kaelyn Conte, I believe the best course of action is to remain conservative from vascular surgery standpoint.  Currently, she is doing well and denies any strokelike symptoms.  I did review her testing which shows 50 to 69% right carotid stenosis and less than 50% left carotid stenosis.  Her feet are nice and warm with palpable pulses.  She does have some complaints of pain in her feet.  She is diabetic and reports most recent A1c was 7.1.  I also encouraged her to continue to wear her compression stockings on a daily basis and to keep her legs elevated when she is not on them.  I did discuss vascular risk factors as they pertain to the progression of vascular disease including controlling her diabetes mellitus and hypertension, which are stable on her current medication regimen. Patient's Body mass index is 27.63 kg/m². BMI is above normal parameters. Recommendations include: educational material. The patient is to continue taking their medications as previously discussed.   This was all discussed in full with complete understanding.    Thank you for allowing me to participate in the care of your patient.  Please do not hesitate to call with any questions or concerns.  We will keep you aware of any further encounters with Kaelyn Conte.      Sincerely Yours,      HUMBLE Gu

## 2020-11-16 ENCOUNTER — LAB (OUTPATIENT)
Dept: LAB | Facility: HOSPITAL | Age: 70
End: 2020-11-16

## 2020-11-16 ENCOUNTER — TRANSCRIBE ORDERS (OUTPATIENT)
Dept: ADMINISTRATIVE | Facility: HOSPITAL | Age: 70
End: 2020-11-16

## 2020-11-16 DIAGNOSIS — E78.5 HYPERLIPIDEMIA, UNSPECIFIED HYPERLIPIDEMIA TYPE: ICD-10-CM

## 2020-11-16 DIAGNOSIS — E11.69 TYPE 2 DIABETES MELLITUS WITH OTHER SPECIFIED COMPLICATION, UNSPECIFIED WHETHER LONG TERM INSULIN USE (HCC): Primary | ICD-10-CM

## 2020-11-16 LAB
ALBUMIN SERPL-MCNC: 4.3 G/DL (ref 3.5–5.2)
ALBUMIN/GLOB SERPL: 1.7 G/DL
ALP SERPL-CCNC: 103 U/L (ref 39–117)
ALT SERPL W P-5'-P-CCNC: 15 U/L (ref 1–33)
ANION GAP SERPL CALCULATED.3IONS-SCNC: 6.5 MMOL/L (ref 5–15)
AST SERPL-CCNC: 20 U/L (ref 1–32)
BILIRUB SERPL-MCNC: 0.4 MG/DL (ref 0–1.2)
BUN SERPL-MCNC: 18 MG/DL (ref 8–23)
BUN/CREAT SERPL: 25.7 (ref 7–25)
CALCIUM SPEC-SCNC: 8.9 MG/DL (ref 8.6–10.5)
CHLORIDE SERPL-SCNC: 103 MMOL/L (ref 98–107)
CO2 SERPL-SCNC: 29.5 MMOL/L (ref 22–29)
CREAT SERPL-MCNC: 0.7 MG/DL (ref 0.57–1)
GFR SERPL CREATININE-BSD FRML MDRD: 83 ML/MIN/1.73
GLOBULIN UR ELPH-MCNC: 2.5 GM/DL
GLUCOSE SERPL-MCNC: 167 MG/DL (ref 65–99)
HBA1C MFR BLD: 7.23 % (ref 4.8–5.6)
POTASSIUM SERPL-SCNC: 4.3 MMOL/L (ref 3.5–5.2)
PROT SERPL-MCNC: 6.8 G/DL (ref 6–8.5)
SODIUM SERPL-SCNC: 139 MMOL/L (ref 136–145)

## 2020-11-16 PROCEDURE — 36415 COLL VENOUS BLD VENIPUNCTURE: CPT | Performed by: FAMILY MEDICINE

## 2020-11-16 PROCEDURE — 80053 COMPREHEN METABOLIC PANEL: CPT | Performed by: FAMILY MEDICINE

## 2020-11-16 PROCEDURE — 83036 HEMOGLOBIN GLYCOSYLATED A1C: CPT | Performed by: FAMILY MEDICINE

## 2021-01-05 ENCOUNTER — HOSPITAL ENCOUNTER (OUTPATIENT)
Dept: PHYSICAL THERAPY | Age: 71
Setting detail: THERAPIES SERIES
Discharge: HOME | End: 2021-01-05
Payer: MEDICARE

## 2021-01-05 PROCEDURE — 97162 PT EVAL MOD COMPLEX 30 MIN: CPT

## 2021-01-05 NOTE — PROGRESS NOTES
Physical Therapy  Initial Assessment  Date: 2021  Patient Name: Juan M Siu  MRN: [id number]  : 1950     Treatment Diagnosis: Unsteadiness    Restrictions       Subjective   General  Chart Reviewed: Yes  Patient assessed for rehabilitation services?: Yes  Additional Pertinent Hx: 80 y/o F presents with complaints of unsteadiness. Hx DM, osteoporosis, lung ca, breast ca, carotid artery occlusion  Response To Previous Treatment: Not applicable  Family / Caregiver Present: No  Referring Practitioner: Annette Stevens DO  Referral Date : 20  Diagnosis: Balance difficulty  Follows Commands: Within Functional Limits  PT Visit Information  PT Insurance Information: [employer]  Total # of Visits to Date: 1  Progress Note Due Date: 21  Subjective  Subjective: Pt presents with c/o unsteadiness and near falls. She lists an example as last night, she bent to pick something up from the floor, turned to place it on her bed, and nearly lost her balance. Initially described this as dizziness, but later stated \"I have a history of vertigo and it's nothing like that. \"  In general, she is active, but expresses a mild fear of falling.   Pain Screening  Patient Currently in Pain: No  Vital Signs  Patient Currently in Pain: No    Vision/Hearing  Vision  Vision: Within Functional Limits  Hearing  Hearing: Within functional limits    Orientation  Orientation  Overall Orientation Status: Within Normal Limits    Social/Functional History  Social/Functional History  Lives With: Other (comment)(Significant other comes over daily)  Type of Home: Apartment  Home Layout: One level  Home Access: Stairs to enter without rails  Entrance Stairs - Number of Steps: 2  Bathroom Shower/Tub: Tub/Shower unit  Bathroom Equipment: Grab bars in shower  ADL Assistance: Independent  Homemaking Assistance: Independent  Ambulation Assistance: Independent  Active : Yes  Leisure & Hobbies: Prior to Srinivas, was going to gym and walking in [place] [employer] [employer] with jeimy    Objective    Strength RLE  R Hip Flexion: 5/5  R Hip Extension: 5/5  R Hip ABduction: 5/5  R Hip ADduction: 5/5  R Knee Flexion: 5/5  R Knee Extension: 5/5  R Ankle Dorsiflexion: 5/5  R Ankle Plantar flexion: 5/5  R Ankle Inversion: 5/5  R Ankle Eversion: 4+/5  Strength LLE  L Hip Flexion: 5/5  L Hip Extension: 5/5  L Hip ABduction: 5/5  L Hip ADduction: 5/5  L Knee Flexion: 5/5  L Knee Extension: 5/5  L Ankle Dorsiflexion: 5/5  L Ankle Plantar Flexion: 5/5  L Ankle Inversion: 5/5  L Ankle Eversion: 4+/5     Additional Measures  Special Tests: Sampson Balance: 51/56. Dynamic Gait Index 21/24  Other: Able to accept min-mod balance perturbations in all directions  Sensation  Overall Sensation Status: WNL             Ambulation  Ambulation?: Yes  Ambulation 1  Surface: level tile  Device: No Device  Assistance: Independent  Quality of Gait: Good pace, arm swing, step length. Has slowed pace and path deviations with head turns and with quick direction changes. Exercises  Exercise 1: Ambulation with horizontal/vertical head turns  Exercise 2: Box steps cw/ccw  Exercise 3: Fwd/back ambulation on line (progress to ball toss)  Exercise 4: Sidestepping on line  Exercise 5: Cone weaves  Exercise 6: Stepping over cones on side  Exercise 7: Figure 8s  Exercise 8: 360s  Exercise 9: Repeated sit to stand without UE use  Exercise 10: Stance on green foam EO/EC/narrow MICHAEL  Exercise 11: Stance on green foam with balance perturbations  Exercise 12: Tandem stance  Exercise 13: SLS  Exercise 14: Toe taps on cone  Exercise 15: Marching on trampoline  Exercise 16: Ambulation around room picking up targets at varying heights  Exercise 17: Sitting wobble board  Exercise 18: Bilat ankle eversion with t-band                      Assessment   Conditions Requiring Skilled Therapeutic Intervention  Body structures, Functions, Activity limitations: Decreased functional mobility ; Decreased balance;Decreased high-level IADLs  Assessment: Pt presents with complaint of unsteadiness and balance difficulty. Demonstrates most difficulty with dynamic balance activities, especially ones involving altered base of support, and will benefit from skilled PT intervention to address listed impairments. Treatment Diagnosis: Unsteadiness  Prognosis: Good  Decision Making: Medium Complexity  REQUIRES PT FOLLOW UP: Yes  Treatment Initiated : Pre-cert req'd  Discharge Recommendations: Continue to assess pending progress  Activity Tolerance  Activity Tolerance: Patient Tolerated treatment well         Plan   Plan  Times per week: 2x  Plan weeks: 4-6 weeks  Current Treatment Recommendations: Strengthening, Neuromuscular Re-education, Home Exercise Program, Safety Education & Training, Balance Training, Patient/Caregiver Education & Training    Goals  Short term goals  Time Frame for Short term goals: 3-4 weeks  Short term goal 1: Independent with HEP  Short term goal 2: Improve bilat ankle eversion to at 5/5. Short term goal 3: Perform at least 13 sit to  30\" from black chair w/o UE use. Long term goals  Time Frame for Long term goals : 4-6 weeks  Long term goal 1: Report improved tolerance (no LOB) while reaching for objects outside MICHAEL during housework. Long term goal 2: Display less noticeable gait deviations during ambulation with head turns. Long term goal 3: Improve Sampson score to at least 53/56. Long term goal 4: Improve DGI score to 23/23.   Patient Goals   Patient goals : improve balance       Therapy Time   Individual Concurrent Group Co-treatment   Time In 0945         Time Out [address]         Minutes [address] [facility]

## 2021-01-19 ENCOUNTER — HOSPITAL ENCOUNTER (OUTPATIENT)
Dept: PHYSICAL THERAPY | Age: 71
Setting detail: THERAPIES SERIES
Discharge: HOME | End: 2021-01-19
Payer: MEDICARE

## 2021-01-19 PROCEDURE — 97110 THERAPEUTIC EXERCISES: CPT

## 2021-01-22 ENCOUNTER — HOSPITAL ENCOUNTER (OUTPATIENT)
Dept: PHYSICAL THERAPY | Age: 71
Setting detail: THERAPIES SERIES
Discharge: HOME | End: 2021-01-22
Payer: MEDICARE

## 2021-01-22 PROCEDURE — 97110 THERAPEUTIC EXERCISES: CPT

## 2021-01-22 PROCEDURE — 97112 NEUROMUSCULAR REEDUCATION: CPT

## 2021-01-22 NOTE — PROGRESS NOTES
Physical Therapy  Daily Treatment Note  Date: 2021  Patient Name: Ino Wyman  MRN: [id number]     :   1950    Subjective:   General  Additional Pertinent Hx: 80 y/o F presents with complaints of unsteadiness. Hx DM, osteoporosis, lung ca, breast ca, carotid artery occlusion  Referring Practitioner: Leticia Ayers DO PT Insurance Information: [facility] [employer] Medicare  Total # of Visits Approved: 12  Total # of Visits to Date: 3  Plan of Care/Certification Expiration Date: 21  Progress Note Due Date: 21  Subjective: Other than my sinuses acting up i feel alright  Patient Currently in Pain: No         Treatment Activities:        Exercises  Exercise 1: Ambulation with horizontal/vertical head turns  120' x 1 ea  Exercise 2: Box steps cw/ccwx 7 ea  Exercise 3: Fwd/back ambulation on line (progress to ball toss) x 3  Exercise 4: Sidestepping on line x 3  Exercise 5: Cone weaves x 4  Exercise 6: Figure 8s x 10  Exercise 7: Stepping over curbs x 3  Exercise 8: 360s x 3 ea  Exercise 9: Repeated sit to stand without UE use x 10 (from black chair)  Exercise 10: Stance on green foam EO/EC/narrow MICHAEL x 1' ea  Exercise 11: Stance on green foam with balance perturbations x 1'  Exercise 12: Tandem stance 2 x 30\" ea  Exercise 13: SLS 2 x 15-20\" ea  Exercise 14: Marching on trampoline x 1'  Exercise 15: Toe taps on cone x 10 ea in  bars  Exercise 16: Wobble board x 10 F/B and M/L, x 5 ea cw/ccw in  bars  Exercise 17: Bilat ankle eversion with t-band x 10 ea (green)  Exercise 18: Ambulation around room picking up targets at varying heights x 0        Assessment:   Conditions Requiring Skilled Therapeutic Intervention  Body structures, Functions, Activity limitations: Decreased functional mobility ; Decreased balance;Decreased high-level IADLs  Assessment: Patient with good tolerance for exercise routine and added gait withvertical and horizontal head movements today and this did cause slight disruption during gait but no LOB, increased reps with cone weaves and box step and she is ready to add ball toss with these exercises. Treatment Diagnosis: Unsteadiness  REQUIRES PT FOLLOW UP: Yes        Goals:  Short term goals  Time Frame for Short term goals: 3-4 weeks  Short term goal 1: Independent with HEP  Short term goal 2: Improve bilat ankle eversion to at 5/5. Short term goal 3: Perform at least 13 sit to  30\" from black chair w/o UE use. Long term goals  Time Frame for Long term goals : 4-6 weeks  Long term goal 1: Report improved tolerance (no LOB) while reaching for objects outside MICHAEL during housework. Long term goal 2: Display less noticeable gait deviations during ambulation with head turns. Long term goal 3: Improve Sampson score to at least 53/56. Long term goal 4: Improve DGI score to 23/23.   Patient Goals   Patient goals : improve balance    Plan:    Times per week: 2x  Plan weeks: 4-6 weeks  Current Treatment Recommendations: Strengthening, Neuromuscular Re-education, Home Exercise Program, Safety Education & Training, Balance Training, Patient/Caregiver Education & Training        Therapy Time   Individual Concurrent Group Co-treatment   Time In 9541         Time Out 1137         Minutes [address] [facility]    Electronically signed by Silvio Leiva PTA on 1/22/2021 at 11:49 AM

## 2021-01-26 ENCOUNTER — HOSPITAL ENCOUNTER (OUTPATIENT)
Dept: PHYSICAL THERAPY | Age: 71
Setting detail: THERAPIES SERIES
Discharge: HOME | End: 2021-01-26
Payer: MEDICARE

## 2021-01-26 PROCEDURE — 97110 THERAPEUTIC EXERCISES: CPT

## 2021-01-26 NOTE — PROGRESS NOTES
Physical Therapy  Daily Treatment Note  Date: 2021  Patient Name: Teresa Magaña  MRN: [id number]     :   1950    Subjective:   General  Chart Reviewed: Yes  Additional Pertinent Hx: 78 y/o F presents with complaints of unsteadiness. Hx DM, osteoporosis, lung ca, breast ca, carotid artery occlusion  Response To Previous Treatment: Patient with no complaints from previous session. Family / Caregiver Present: No  Referring Practitioner: Nenita Mccormick DO  PT Visit Information  PT Insurance Information: [facility] [facility] Medicare  Total # of Visits Approved: 12  Total # of Visits to Date: 4  Plan of Care/Certification Expiration Date: 21  Progress Note Due Date: 21  Subjective  Subjective: No complaints today. She has been busy over the past few days with running errands. Pain Screening  Patient Currently in Pain: No  Vital Signs  Patient Currently in Pain: No       Treatment Activities:     Exercises  Exercise 1: Ambulation with horizontal/vertical head turns  120' x 1 ea  Exercise 2: Box steps cw/ccwx 7 ea  Exercise 3: Fwd/back ambulation on line (progress to ball toss) x 3  Exercise 4: Sidestepping on line x 3 (ball toss)  Exercise 5: Cone weaves x 4  Exercise 6: Figure 8s x 10  Exercise 7: Stepping over curbs x 3- not today  Exercise 8: Sit to stand, 360, return to sitting x 3 ea  Exercise 9: Repeated sit to stand without UE use x 10 (from black chair)  Exercise 10: Stance on green foam EO/EC/narrow MICHAEL x 1' ea  Exercise 11: Stance on green foam with balance perturbations x 1'  Exercise 12: Tandem stance 2 x 30\" ea  Exercise 13: SLS 2 x 30\" ea  Exercise 14: Marching on trampoline x 1'  Exercise 15: Toe taps on cone x 10 ea in  bars  Exercise 16: Wobble board x 10 F/B and M/L, x 5 ea cw/ccw in  bars  Exercise 17: Bilat ankle eversion with t-band x 15 ea (green)  Exercise 18:  Ambulation around room picking up targets at varying heights x 0     Assessment:   Conditions Requiring Skilled Therapeutic Intervention  Body structures, Functions, Activity limitations: Decreased functional mobility ; Decreased balance;Decreased high-level IADLs  Assessment: Added ball toss with box steps and side stepping and pt tolerated well. Occasional crossed step but no true LOB. During ambulation with head turns, at times uneven step length, as well as increased lateral path deviation, but again, no LOB. Had no complaint at end of session. Treatment Diagnosis: Unsteadiness  REQUIRES PT FOLLOW UP: Yes  Discharge Recommendations: Continue to assess pending progress      Goals:  Short term goals  Time Frame for Short term goals: 3-4 weeks  Short term goal 1: Independent with HEP  Short term goal 2: Improve bilat ankle eversion to at 5/5. Short term goal 3: Perform at least 13 sit to  30\" from black chair w/o UE use. Long term goals  Time Frame for Long term goals : 4-6 weeks  Long term goal 1: Report improved tolerance (no LOB) while reaching for objects outside MICHAEL during housework. Long term goal 2: Display less noticeable gait deviations during ambulation with head turns. Long term goal 3: Improve Sampson score to at least 53/56. Long term goal 4: Improve DGI score to 23/23.   Patient Goals   Patient goals : improve balance    Plan:    Plan  Times per week: 2x  Plan weeks: 4-6 weeks  Current Treatment Recommendations: Strengthening, Neuromuscular Re-education, Home Exercise Program, Safety Education & Training, Balance Training, Patient/Caregiver Education & Training  Timed Code Treatment Minutes: 52 Minutes     Therapy Time   Individual Concurrent Group Co-treatment   Time In [address]         Time Out 3774         Minutes 49         Timed Code Treatment Minutes: 2018 [facility] Janet Chaparro, PT

## 2021-01-29 ENCOUNTER — HOSPITAL ENCOUNTER (OUTPATIENT)
Dept: PHYSICAL THERAPY | Age: 71
Setting detail: THERAPIES SERIES
Discharge: HOME | End: 2021-01-29
Payer: MEDICARE

## 2021-01-29 PROCEDURE — 97110 THERAPEUTIC EXERCISES: CPT

## 2021-01-29 NOTE — PROGRESS NOTES
Daily Treatment Note  Date: 2021  Patient Name: Sonali Williamson  MRN: [id number]     :   1950    Subjective:   General  Chart Reviewed: Yes  Additional Pertinent Hx: 80 y/o F presents with complaints of unsteadiness. Hx DM, osteoporosis, lung ca, breast ca, carotid artery occlusion  Response To Previous Treatment: Patient with no complaints from previous session. Family / Caregiver Present: No  Referring Practitioner: Madelon Officer, DO  PT Visit Information  PT Insurance Information: [facility] [employer] Medicare  Total # of Visits Approved: 12  Total # of Visits to Date: 5  Plan of Care/Certification Expiration Date: 21  Progress Note Due Date: 21  Subjective  Subjective: I haven't had any falls lately  Pain Screening  Patient Currently in Pain: No  Vital Signs  Patient Currently in Pain: No       Treatment Activities:      Exercises  Exercise 1: Ambulation with horizontal/vertical head turns  120' x 1 ea  Exercise 2: Box steps cw/ccwx 10 ea  Exercise 3: Fwd/back ambulation on line with ball toss) x 3  Exercise 4: Sidestepping on line x 3 (ball toss)  Exercise 5: Cone weaves x 5  Exercise 6: Figure 8s x 10  Exercise 7: Stepping over curbs x 3  Exercise 8: Sit to stand, 360, return to sitting x 3 ea  Exercise 9: Repeated sit to stand without UE use x 10 (from black chair)  Exercise 10: Stance on green foam EO/EC/narrow MICHAEL x 1' ea  Exercise 11: Stance on green foam with balance perturbations x 1'  Exercise 12: Tandem stance 2 x 30\" ea  Exercise 13: SLS 2 x 30\" ea  Exercise 14: Marching on trampoline x 1'  Exercise 15: Toe taps on cone x 10 ea in  bars  Exercise 16: Wobble board   F/B and M/L,  cw/ccw in  bars  x 10 ea  Exercise 17: Bilat ankle eversion with t-band x 15 ea (green)  Exercise 18:  Ambulation around room picking up targets at varying heights x 0  Exercise 19:   HEP issued with green t-band         Assessment:   Conditions Requiring Skilled Therapeutic Intervention  Body structures, Functions, Activity limitations: Decreased functional mobility ; Decreased balance;Decreased high-level IADLs  Assessment: Patient performing exericses well and no noted LOB throughout session. During horizonal head turns she has lateral path deviations however no LOB. HEP issued today with green t-band and instructions to perform daily at kitchen counter or in safe envioronment. Treatment Diagnosis: Unsteadiness  REQUIRES PT FOLLOW UP: Yes  Discharge Recommendations: Continue to assess pending progress    Goals:  Short term goals  Time Frame for Short term goals: 3-4 weeks  Short term goal 1: Independent with HEP  Short term goal 2: Improve bilat ankle eversion to at 5/5. Short term goal 3: Perform at least 13 sit to  30\" from black chair w/o UE use. Long term goals  Time Frame for Long term goals : 4-6 weeks  Long term goal 1: Report improved tolerance (no LOB) while reaching for objects outside MICHAEL during housework. Long term goal 2: Display less noticeable gait deviations during ambulation with head turns. Long term goal 3: Improve Sampson score to at least 53/56. Long term goal 4: Improve DGI score to 23/23.   Patient Goals   Patient goals : improve balance    Plan:    Plan  Times per week: 2x  Plan weeks: 4-6 weeks  Current Treatment Recommendations: Strengthening, Neuromuscular Re-education, Home Exercise Program, Safety Education & Training, Balance Training, Patient/Caregiver Education & Training  Timed Code Treatment Minutes: 60 Minutes     Therapy Time   Individual Concurrent Group Co-treatment   Time In 0900         Time Out 1000         Minutes 60         Timed Code Treatment Minutes: 2000 [employer], TOPHER    Electronically signed by Guicho Montes PTA on 1/29/2021 at 11:11 AM

## 2021-02-02 ENCOUNTER — HOSPITAL ENCOUNTER (OUTPATIENT)
Dept: PHYSICAL THERAPY | Age: 71
Setting detail: THERAPIES SERIES
Discharge: HOME | End: 2021-02-02
Payer: MEDICARE

## 2021-02-02 PROCEDURE — 97110 THERAPEUTIC EXERCISES: CPT

## 2021-02-02 NOTE — PROGRESS NOTES
Daily Treatment Note  Date: 2021  Patient Name: Juana Patino  MRN: [id number]     :   1950    Subjective:   General  Chart Reviewed: Yes  Additional Pertinent Hx: 80 y/o F presents with complaints of unsteadiness. Hx DM, osteoporosis, lung ca, breast ca, carotid artery occlusion  Response To Previous Treatment: Patient with no complaints from previous session. Family / Caregiver Present: No  Referring Practitioner: Fran Song,   PT Visit Information  PT Insurance Information: [employer] Medicare  Total # of Visits Approved: 12  Total # of Visits to Date: 6  Plan of Care/Certification Expiration Date: 21  Progress Note Due Date: 21  Subjective  Subjective: I did my exericses last night. Pain Screening  Patient Currently in Pain: No  Vital Signs  Patient Currently in Pain: No       Treatment Activities:    Exercises  Exercise 1: Ambulation with horizontal/vertical head turns  120' x 1 ea  Exercise 2: Box steps cw/ccwx 10 ea  Exercise 3: Fwd/back ambulation on line with ball toss) x 3  without ball  Exercise 4: Sidestepping on line x 3 (ball toss)  without ball  Exercise 5: Cone weaves x 5  Exercise 6: Figure 8s x 10  Exercise 7: Stepping over curbs x 3  Exercise 8: Sit to stand, 360, return to sitting x 3 ea  Exercise 9: Repeated sit to stand without UE use x 10 (from black chair)  Exercise 10: Stance on green foam EO/EC/narrow MICHAEL x 1' ea  Exercise 11: Stance on green foam with balance perturbations x 1'  Exercise 12: Tandem stance 2 x 30\" ea  Exercise 13: SLS 2 x 30\" ea  Exercise 14: Marching on trampoline x 1'  Exercise 15: Toe taps on cone x 10 ea in  bars  Exercise 16: Wobble board   F/B and M/L,  cw/ccw in  bars  x 10 ea  Exercise 17: Bilat ankle eversion with t-band x 15 ea (green)  Exercise 18:  Ambulation around room picking up targets at varying heights x 0  Exercise 19:   HEP issued with green t-band      Assessment:   Conditions Requiring Skilled Therapeutic Intervention  Body structures, Functions, Activity limitations: Decreased functional mobility ; Decreased balance;Decreased high-level IADLs  Assessment: Patient continues to perform exericses well and no noted LOB throughout session. During horizonal head turns she had less lateral path deviations today than previous. She reports performing HEP at home with no questins at this time. Treatment Diagnosis: Unsteadiness  REQUIRES PT FOLLOW UP: Yes  Discharge Recommendations: Continue to assess pending progress    Goals:  Short term goals  Time Frame for Short term goals: 3-4 weeks  Short term goal 1: Independent with HEP  Short term goal 2: Improve bilat ankle eversion to at 5/5. Short term goal 3: Perform at least 13 sit to  30\" from black chair w/o UE use. Long term goals  Time Frame for Long term goals : 4-6 weeks  Long term goal 1: Report improved tolerance (no LOB) while reaching for objects outside MICHAEL during housework. Long term goal 2: Display less noticeable gait deviations during ambulation with head turns. Long term goal 3: Improve Sampson score to at least 53/56. Long term goal 4: Improve DGI score to 23/23.   Patient Goals   Patient goals : improve balance    Plan:    Plan  Times per week: 2x  Plan weeks: 4-6 weeks  Current Treatment Recommendations: Strengthening, Neuromuscular Re-education, Home Exercise Program, Safety Education & Training, Balance Training, Patient/Caregiver Education & Training  Timed Code Treatment Minutes: 41 Minutes     Therapy Time   Individual Concurrent Group Co-treatment   Time In 1003         Time Out 9402         Minutes 41         Timed Code Treatment Minutes: 375 Bushra Ann PTA    Electronically signed by Kristen Chester PTA on 2/2/2021 at 10:52 AM

## 2021-02-04 ENCOUNTER — IMMUNIZATION (OUTPATIENT)
Dept: VACCINE CLINIC | Facility: HOSPITAL | Age: 71
End: 2021-02-04

## 2021-02-04 PROCEDURE — 91301 HC SARSCO02 VAC 100MCG/0.5ML IM: CPT | Performed by: OBSTETRICS & GYNECOLOGY

## 2021-02-04 PROCEDURE — 0011A: CPT | Performed by: OBSTETRICS & GYNECOLOGY

## 2021-02-05 ENCOUNTER — HOSPITAL ENCOUNTER (OUTPATIENT)
Dept: PHYSICAL THERAPY | Age: 71
Setting detail: THERAPIES SERIES
Discharge: HOME | End: 2021-02-05
Payer: MEDICARE

## 2021-02-05 PROCEDURE — 97110 THERAPEUTIC EXERCISES: CPT

## 2021-02-05 PROCEDURE — 97750 PHYSICAL PERFORMANCE TEST: CPT

## 2021-02-05 NOTE — PROGRESS NOTES
Physical Therapy  Daily Treatment Note  Date: 2021  Patient Name: Inessa Sierra  MRN: [id number]     :   1950    Subjective:   General  Chart Reviewed: Yes  Additional Pertinent Hx: 80 y/o F presents with complaints of unsteadiness. Hx DM, osteoporosis, lung ca, breast ca, carotid artery occlusion  Response To Previous Treatment: Patient with no complaints from previous session. Family / Caregiver Present: No  Referring Practitioner: Radha Carr, DO  PT Visit Information  PT Insurance Information: [facility] Medicare  Total # of Visits Approved: 12  Total # of Visits to Date: 7  Plan of Care/Certification Expiration Date: 21  Progress Note Due Date: 21  Subjective  Subjective: \"I can tell I've been doing better. \"  Notes overall improvement with standing balance, in particular that she no longer feels she is tipping backwards in the shower when she leans her head back. Intends to continue with HEP and return to mall walking when able. Plans to try to get her boyfriend to participate in exercises. She is happy because she got her first COVID shot yesterday. Pain Screening  Patient Currently in Pain: Denies  Vital Signs  Patient Currently in Pain: Denies       Treatment Activities:      Assessment:   Conditions Requiring Skilled Therapeutic Intervention  Assessment: Ms. Clive Jones has tolerated therapy sessions well. No LOB throughout. She is consistent with performing HEP, as well as increasing activity. She feels comfortable to continue with exercises at home, and requests to make today her last formal visit. She scored 56/56 on Sampson Balance test and 23/23 on Dynamic Gait Index, presenting little to no risk of fall. She met all but one goal (number of sit to stands) and has no complaint of impairment. Will d/c from formal services at this time.   Treatment Diagnosis: Unsteadiness  REQUIRES PT FOLLOW UP: Yes  Discharge Recommendations: Continue to assess pending progress Goals:  Short term goals  Time Frame for Short term goals: 3-4 weeks  Short term goal 1: Independent with HEP- met(2/5: \"I pretty much remembered them from here. \")  Short term goal 2: Improve bilat ankle eversion to at 5/5.- met(2/5: Both 5/5.)  Short term goal 3: Perform at least 13 sit to  30\" from black chair w/o UE use. - progress(2/5: 9.5 in 30\")  Long term goals  Time Frame for Long term goals : 4-6 weeks  Long term goal 1: Report improved tolerance (no LOB) while reaching for objects outside MICHAEL during housework. - met(2/5: No issues)  Long term goal 2: Display less noticeable gait deviations during ambulation with head turns. - met(2/5: No notable deviations in gait)  Long term goal 3: Improve Sampson score to at least 53/56.- met(2/5: 56/56)  Long term goal 4: Improve DGI score to 23/23.- met(2/5: 23/23)  Patient Goals   Patient goals : improve balance    Plan:    Plan  Times per week: 2x  Plan weeks: 4-6 weeks  Current Treatment Recommendations: Strengthening, Neuromuscular Re-education, Home Exercise Program, Safety Education & Training, Balance Training, Patient/Caregiver Education & Training  Timed Code Treatment Minutes: 51 Minutes     Therapy Time   Individual Concurrent Group Co-treatment   Time In 1009         Time Out 1100         Minutes 51         Timed Code Treatment Minutes: 2121 Ricci Montoya, PT

## 2021-03-04 ENCOUNTER — IMMUNIZATION (OUTPATIENT)
Dept: VACCINE CLINIC | Facility: HOSPITAL | Age: 71
End: 2021-03-04

## 2021-03-04 PROCEDURE — 91301 HC SARSCO02 VAC 100MCG/0.5ML IM: CPT | Performed by: OBSTETRICS & GYNECOLOGY

## 2021-03-04 PROCEDURE — 0012A: CPT | Performed by: OBSTETRICS & GYNECOLOGY

## 2021-04-05 ENCOUNTER — LAB (OUTPATIENT)
Dept: LAB | Facility: HOSPITAL | Age: 71
End: 2021-04-05

## 2021-04-05 ENCOUNTER — TRANSCRIBE ORDERS (OUTPATIENT)
Dept: LAB | Facility: HOSPITAL | Age: 71
End: 2021-04-05

## 2021-04-05 DIAGNOSIS — E78.5 HYPERLIPIDEMIA, UNSPECIFIED HYPERLIPIDEMIA TYPE: ICD-10-CM

## 2021-04-05 DIAGNOSIS — I10 ESSENTIAL HYPERTENSION: ICD-10-CM

## 2021-04-05 DIAGNOSIS — E11.69 DIABETES MELLITUS ASSOCIATED WITH HORMONAL ETIOLOGY (HCC): ICD-10-CM

## 2021-04-05 DIAGNOSIS — E11.69 DIABETES MELLITUS ASSOCIATED WITH HORMONAL ETIOLOGY (HCC): Primary | ICD-10-CM

## 2021-04-05 LAB
ALBUMIN SERPL-MCNC: 4 G/DL (ref 3.5–5.2)
ALBUMIN/GLOB SERPL: 1.5 G/DL
ALP SERPL-CCNC: 103 U/L (ref 39–117)
ALT SERPL W P-5'-P-CCNC: 16 U/L (ref 1–33)
ANION GAP SERPL CALCULATED.3IONS-SCNC: 8.1 MMOL/L (ref 5–15)
AST SERPL-CCNC: 17 U/L (ref 1–32)
BILIRUB SERPL-MCNC: 0.3 MG/DL (ref 0–1.2)
BUN SERPL-MCNC: 19 MG/DL (ref 8–23)
BUN/CREAT SERPL: 24.1 (ref 7–25)
CALCIUM SPEC-SCNC: 9.1 MG/DL (ref 8.6–10.5)
CHLORIDE SERPL-SCNC: 106 MMOL/L (ref 98–107)
CHOLEST SERPL-MCNC: 164 MG/DL (ref 0–200)
CO2 SERPL-SCNC: 28.9 MMOL/L (ref 22–29)
CREAT SERPL-MCNC: 0.79 MG/DL (ref 0.57–1)
GFR SERPL CREATININE-BSD FRML MDRD: 72 ML/MIN/1.73
GLOBULIN UR ELPH-MCNC: 2.6 GM/DL
GLUCOSE SERPL-MCNC: 148 MG/DL (ref 65–99)
HBA1C MFR BLD: 7.25 % (ref 4.8–5.6)
HDLC SERPL-MCNC: 50 MG/DL (ref 40–60)
LDLC SERPL CALC-MCNC: 93 MG/DL (ref 0–100)
LDLC/HDLC SERPL: 1.82 {RATIO}
POTASSIUM SERPL-SCNC: 4.7 MMOL/L (ref 3.5–5.2)
PROT SERPL-MCNC: 6.6 G/DL (ref 6–8.5)
SODIUM SERPL-SCNC: 143 MMOL/L (ref 136–145)
TRIGL SERPL-MCNC: 116 MG/DL (ref 0–150)
VLDLC SERPL-MCNC: 21 MG/DL (ref 5–40)

## 2021-04-05 PROCEDURE — 36415 COLL VENOUS BLD VENIPUNCTURE: CPT

## 2021-04-05 PROCEDURE — 80061 LIPID PANEL: CPT

## 2021-04-05 PROCEDURE — 80053 COMPREHEN METABOLIC PANEL: CPT

## 2021-04-05 PROCEDURE — 83036 HEMOGLOBIN GLYCOSYLATED A1C: CPT

## 2021-04-07 ENCOUNTER — TRANSCRIBE ORDERS (OUTPATIENT)
Dept: ADMINISTRATIVE | Facility: HOSPITAL | Age: 71
End: 2021-04-07

## 2021-04-07 DIAGNOSIS — Z12.31 ENCOUNTER FOR SCREENING MAMMOGRAM FOR MALIGNANT NEOPLASM OF BREAST: Primary | ICD-10-CM

## 2021-07-19 ENCOUNTER — APPOINTMENT (OUTPATIENT)
Dept: MAMMOGRAPHY | Facility: HOSPITAL | Age: 71
End: 2021-07-19

## 2021-07-21 ENCOUNTER — HOSPITAL ENCOUNTER (OUTPATIENT)
Dept: MAMMOGRAPHY | Facility: HOSPITAL | Age: 71
Discharge: HOME | End: 2021-07-21
Admitting: FAMILY MEDICINE

## 2021-07-21 ENCOUNTER — TRANSCRIBE ORDERS (OUTPATIENT)
Dept: ADMINISTRATIVE | Facility: HOSPITAL | Age: 71
End: 2021-07-21

## 2021-07-21 DIAGNOSIS — E78.5 HYPERLIPIDEMIA, UNSPECIFIED HYPERLIPIDEMIA TYPE: ICD-10-CM

## 2021-07-21 DIAGNOSIS — Z12.31 ENCOUNTER FOR SCREENING MAMMOGRAM FOR MALIGNANT NEOPLASM OF BREAST: Primary | ICD-10-CM

## 2021-07-21 DIAGNOSIS — Z12.31 ENCOUNTER FOR SCREENING MAMMOGRAM FOR MALIGNANT NEOPLASM OF BREAST: ICD-10-CM

## 2021-07-21 DIAGNOSIS — E11.69 TYPE 2 DIABETES MELLITUS WITH OTHER SPECIFIED COMPLICATION, UNSPECIFIED WHETHER LONG TERM INSULIN USE (HCC): ICD-10-CM

## 2021-07-21 PROCEDURE — 77063 BREAST TOMOSYNTHESIS BI: CPT

## 2021-07-21 PROCEDURE — 77067 SCR MAMMO BI INCL CAD: CPT

## 2021-07-26 ENCOUNTER — LAB (OUTPATIENT)
Dept: LAB | Facility: HOSPITAL | Age: 71
End: 2021-07-26

## 2021-07-26 DIAGNOSIS — E11.69 TYPE 2 DIABETES MELLITUS WITH OTHER SPECIFIED COMPLICATION, UNSPECIFIED WHETHER LONG TERM INSULIN USE (HCC): ICD-10-CM

## 2021-07-26 DIAGNOSIS — E78.5 HYPERLIPIDEMIA, UNSPECIFIED HYPERLIPIDEMIA TYPE: ICD-10-CM

## 2021-07-26 LAB
ALBUMIN SERPL-MCNC: 4.4 G/DL (ref 3.5–5.2)
ALBUMIN UR-MCNC: <1.2 MG/DL
ALBUMIN/GLOB SERPL: 2.1 G/DL
ALP SERPL-CCNC: 97 U/L (ref 39–117)
ALT SERPL W P-5'-P-CCNC: 14 U/L (ref 1–33)
ANION GAP SERPL CALCULATED.3IONS-SCNC: 13.1 MMOL/L (ref 5–15)
AST SERPL-CCNC: 19 U/L (ref 1–32)
BILIRUB SERPL-MCNC: 0.3 MG/DL (ref 0–1.2)
BUN SERPL-MCNC: 16 MG/DL (ref 8–23)
BUN/CREAT SERPL: 21.6 (ref 7–25)
CALCIUM SPEC-SCNC: 9 MG/DL (ref 8.6–10.5)
CHLORIDE SERPL-SCNC: 106 MMOL/L (ref 98–107)
CHOLEST SERPL-MCNC: 169 MG/DL (ref 0–200)
CO2 SERPL-SCNC: 21.9 MMOL/L (ref 22–29)
CREAT SERPL-MCNC: 0.74 MG/DL (ref 0.57–1)
CREAT UR-MCNC: 86.6 MG/DL
DEPRECATED RDW RBC AUTO: 38.6 FL (ref 37–54)
ERYTHROCYTE [DISTWIDTH] IN BLOOD BY AUTOMATED COUNT: 11.8 % (ref 12.3–15.4)
GFR SERPL CREATININE-BSD FRML MDRD: 77 ML/MIN/1.73
GLOBULIN UR ELPH-MCNC: 2.1 GM/DL
GLUCOSE SERPL-MCNC: 164 MG/DL (ref 65–99)
HBA1C MFR BLD: 7.35 % (ref 4.8–5.6)
HCT VFR BLD AUTO: 40.6 % (ref 34–46.6)
HDLC SERPL-MCNC: 43 MG/DL (ref 40–60)
HGB BLD-MCNC: 13.8 G/DL (ref 12–15.9)
LDLC SERPL CALC-MCNC: 99 MG/DL (ref 0–100)
LDLC/HDLC SERPL: 2.21 {RATIO}
MCH RBC QN AUTO: 30.9 PG (ref 26.6–33)
MCHC RBC AUTO-ENTMCNC: 34 G/DL (ref 31.5–35.7)
MCV RBC AUTO: 91 FL (ref 79–97)
MICROALBUMIN/CREAT UR: NORMAL MG/G{CREAT}
PLATELET # BLD AUTO: 264 10*3/MM3 (ref 140–450)
PMV BLD AUTO: 10.2 FL (ref 6–12)
POTASSIUM SERPL-SCNC: 4.4 MMOL/L (ref 3.5–5.2)
PROT SERPL-MCNC: 6.5 G/DL (ref 6–8.5)
RBC # BLD AUTO: 4.46 10*6/MM3 (ref 3.77–5.28)
SODIUM SERPL-SCNC: 141 MMOL/L (ref 136–145)
T3FREE SERPL-MCNC: 2.66 PG/ML (ref 2–4.4)
T4 FREE SERPL-MCNC: 0.97 NG/DL (ref 0.93–1.7)
TRIGL SERPL-MCNC: 154 MG/DL (ref 0–150)
TSH SERPL DL<=0.05 MIU/L-ACNC: 1.78 UIU/ML (ref 0.27–4.2)
URATE SERPL-MCNC: 5.5 MG/DL (ref 2.4–5.7)
VLDLC SERPL-MCNC: 27 MG/DL (ref 5–40)
WBC # BLD AUTO: 6.53 10*3/MM3 (ref 3.4–10.8)

## 2021-07-26 PROCEDURE — 82570 ASSAY OF URINE CREATININE: CPT

## 2021-07-26 PROCEDURE — 84550 ASSAY OF BLOOD/URIC ACID: CPT

## 2021-07-26 PROCEDURE — 83036 HEMOGLOBIN GLYCOSYLATED A1C: CPT

## 2021-07-26 PROCEDURE — 84443 ASSAY THYROID STIM HORMONE: CPT

## 2021-07-26 PROCEDURE — 80053 COMPREHEN METABOLIC PANEL: CPT

## 2021-07-26 PROCEDURE — 82043 UR ALBUMIN QUANTITATIVE: CPT

## 2021-07-26 PROCEDURE — 84481 FREE ASSAY (FT-3): CPT

## 2021-07-26 PROCEDURE — 84439 ASSAY OF FREE THYROXINE: CPT

## 2021-07-26 PROCEDURE — 36415 COLL VENOUS BLD VENIPUNCTURE: CPT

## 2021-07-26 PROCEDURE — 85027 COMPLETE CBC AUTOMATED: CPT

## 2021-07-26 PROCEDURE — 80061 LIPID PANEL: CPT

## 2021-07-28 ENCOUNTER — TRANSCRIBE ORDERS (OUTPATIENT)
Dept: ADMINISTRATIVE | Facility: HOSPITAL | Age: 71
End: 2021-07-28

## 2021-07-28 DIAGNOSIS — Z78.0 POSTMENOPAUSAL STATUS: Primary | ICD-10-CM

## 2021-08-04 ENCOUNTER — HOSPITAL ENCOUNTER (OUTPATIENT)
Dept: BONE DENSITY | Facility: HOSPITAL | Age: 71
Discharge: HOME | End: 2021-08-04
Admitting: FAMILY MEDICINE

## 2021-08-04 DIAGNOSIS — Z78.0 POSTMENOPAUSAL STATUS: ICD-10-CM

## 2021-08-04 PROCEDURE — 77080 DXA BONE DENSITY AXIAL: CPT

## 2021-08-20 ENCOUNTER — TRANSCRIBE ORDERS (OUTPATIENT)
Dept: ADMINISTRATIVE | Facility: HOSPITAL | Age: 71
End: 2021-08-20

## 2021-08-20 DIAGNOSIS — M85.89 DISAPPEARING BONE DISEASE: Primary | ICD-10-CM

## 2021-08-23 ENCOUNTER — LAB (OUTPATIENT)
Dept: LAB | Facility: HOSPITAL | Age: 71
End: 2021-08-23

## 2021-08-23 LAB — 25(OH)D3 SERPL-MCNC: 50.4 NG/ML

## 2021-08-23 PROCEDURE — 82306 VITAMIN D 25 HYDROXY: CPT | Performed by: FAMILY MEDICINE

## 2021-08-23 PROCEDURE — 36415 COLL VENOUS BLD VENIPUNCTURE: CPT | Performed by: FAMILY MEDICINE

## 2021-10-20 ENCOUNTER — TELEPHONE (OUTPATIENT)
Dept: VASCULAR SURGERY | Facility: CLINIC | Age: 71
End: 2021-10-20

## 2021-10-20 NOTE — TELEPHONE ENCOUNTER
Spoke with Mrs Conte reminding her of her appointment for Thursday, October 21st, 2021. Reminded [facility] 830 am for 9 o'clock testing and follow up afterwards at 1045 am with Dr Macias. Mrs Conte confirmed she would be here.

## 2021-10-21 ENCOUNTER — OFFICE VISIT (OUTPATIENT)
Dept: VASCULAR SURGERY | Facility: CLINIC | Age: 71
End: 2021-10-21

## 2021-10-21 ENCOUNTER — HOSPITAL ENCOUNTER (OUTPATIENT)
Dept: ULTRASOUND IMAGING | Facility: HOSPITAL | Age: 71
Discharge: HOME | End: 2021-10-21
Admitting: NURSE PRACTITIONER

## 2021-10-21 VITALS
BODY MASS INDEX: 26.93 KG/M2 | DIASTOLIC BLOOD PRESSURE: 76 MMHG | HEIGHT: 63 IN | WEIGHT: 152 LBS | HEART RATE: 63 BPM | SYSTOLIC BLOOD PRESSURE: 116 MMHG | OXYGEN SATURATION: 98 %

## 2021-10-21 DIAGNOSIS — I65.23 BILATERAL CAROTID ARTERY STENOSIS: ICD-10-CM

## 2021-10-21 DIAGNOSIS — I65.23 BILATERAL CAROTID ARTERY STENOSIS: Primary | ICD-10-CM

## 2021-10-21 PROCEDURE — 93880 EXTRACRANIAL BILAT STUDY: CPT

## 2021-10-21 PROCEDURE — 99213 OFFICE O/P EST LOW 20 MIN: CPT | Performed by: NURSE PRACTITIONER

## 2021-10-21 PROCEDURE — 93880 EXTRACRANIAL BILAT STUDY: CPT | Performed by: SURGERY

## 2021-10-21 NOTE — PROGRESS NOTES
"10/21/2021       Komal Laws,   [address]  [place] [address]        Kaelyn Conte  1950    Chief Complaint   Patient presents with   • Follow-up     1 Year Follow Up For Bilateral Carotid Artery Stenosis. Test [id number] US pad carotid bilateral. Patient denies any stroke like symptoms.    • Non Smoker     Patient is a Non Smoker    • Med Management     Verbally verified medications with patient        Dear Komal Laws DO:    HPI     I had the pleasure of seeing you patient in the office today for follow up.  As you recall, the patient is a 71 y.o. female who we are currently following for asymptomatic carotid occlusive disease.  Currently she is doing well and denies any strokelike symptoms.  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  She denies any claudication to her lower extremities.  She is maintained on aspirin.  She did have noninvasive testing performed today, which I did review in office.      Review of Systems   Constitutional: Negative.    HENT: Negative.    Eyes: Negative.    Respiratory: Negative.    Cardiovascular: Negative.    Gastrointestinal: Negative.    Endocrine: Negative.    Genitourinary: Negative.    Musculoskeletal: Negative.    Skin: Negative.    Allergic/Immunologic: Negative.    Neurological: Negative.    Hematological: Negative.    Psychiatric/Behavioral: Negative.        /76 (BP Location: Right arm, Patient Position: Sitting, Cuff Size: Adult)   Pulse 63   Ht 160 cm (63\")   Wt 68.9 kg (152 lb)   LMP  (LMP Unknown)   SpO2 98%   BMI 26.93 kg/m²   Physical Exam  Vitals and nursing note reviewed.   Constitutional:       General: She is not in acute distress.     Appearance: Normal appearance. She is well-developed and normal weight. She is not diaphoretic.   HENT:      Head: Normocephalic and atraumatic.   Eyes:      General: No scleral icterus.     Pupils: Pupils are equal, round, and reactive to " light.   Neck:      Thyroid: No thyromegaly.      Vascular: No carotid bruit or JVD.   Cardiovascular:      Rate and Rhythm: Normal rate and regular rhythm.      Pulses: Normal pulses.           Carotid pulses are 2+ on the right side and 2+ on the left side.       Femoral pulses are 2+ on the right side and 2+ on the left side.       Popliteal pulses are 2+ on the right side and 2+ on the left side.        Dorsalis pedis pulses are 2+ on the right side and 2+ on the left side.        Posterior tibial pulses are 2+ on the right side and 2+ on the left side.      Heart sounds: Normal heart sounds and S2 normal. No murmur heard.  No friction rub. No gallop.    Pulmonary:      Effort: Pulmonary effort is normal.      Breath sounds: Normal breath sounds.   Abdominal:      General: Bowel sounds are normal. There is no distension or abdominal bruit.      Palpations: Abdomen is soft. There is no mass.      Tenderness: There is no abdominal tenderness.   Musculoskeletal:         General: Normal range of motion.      Cervical back: Normal range of motion and neck supple.   Skin:     General: Skin is warm and dry.   Neurological:      General: No focal deficit present.      Mental Status: She is alert and oriented to person, place, and time.      Cranial Nerves: No cranial nerve deficit.      Sensory: No sensory deficit.   Psychiatric:         Mood and Affect: Mood normal.         Behavior: Behavior normal.         Thought Content: Thought content normal.         Judgment: Judgment normal.          DIAGNOSTIC DATA:  US Carotid Bilateral    Result Date: 10/21/2021  Narrative: History: Carotid occlusive disease      Impression: Impression: 1. There is 50-69% stenosis of the right internal carotid artery. 2. There is less than 50% stenosis of the left internal carotid artery. 3. Antegrade flow is demonstrated in bilateral vertebral arteries.  Comments: Bilateral carotid vertebral arterial duplex scan was performed.  Grayscale  imaging shows intimal thickening and calcified elements at the carotid bifurcation. The right internal carotid artery peak systolic velocity is 134.6 cm/sec. The end-diastolic velocity is 37.5 cm/sec. The right ICA/CCA ratio is approximately 1.3 . These findings correlate with 50-69% stenosis of the right internal carotid artery.  Grayscale imaging shows intimal thickening and calcified elements at the carotid bifurcation. The left internal carotid artery peak systolic velocity is 108 cm/sec. The end-diastolic velocity is 31 cm/sec. The left ICA/CCA ratio is approximately 1.2 . These findings correlate with less than 50% stenosis of the left internal carotid artery.  Antegrade flow is demonstrated in bilateral vertebral arteries.  This report was finalized on 10/21/2021 13:03 by Dr. Silvio Macias MD.         Patient Active Problem List   Diagnosis   • Diabetes mellitus (HCC)   • Carotid stenosis, asymptomatic   • Venous (peripheral) insufficiency   • Hx of colonic polyps   • Family history of colon cancer         ICD-10-CM ICD-9-CM   1. Bilateral carotid artery stenosis  I65.23 433.10     433.30       PLAN: After thoroughly evaluating Kaelyn Conte, I believe the best course of action to remain conservative from vascular surgery standpoint.  Currently she is doing well and denies any strokelike symptoms.  I did review her testing which shows 50 to 69% right carotid stenosis and less than 50% left carotid stenosis, which is unchanged from previous testing.  We will see her back in 1 year with repeat noninvasive testing for continued surveillance, including a carotid duplex.  I also encouraged her to continue to wear her compression stockings on a daily basis and to keep her legs elevated when she is not on them.  I did discuss vascular risk factors as they pertain to the progression of vascular disease including controlling her diabetes mellitus, which is slightly uncontrolled with most recent hemoglobin A1c  of 7.3.  Patient's Body mass index is 26.93 kg/m². BMI is above normal parameters. Recommendations include: educational material. The patient is to continue taking their medications as previously discussed.   This was all discussed in full with complete understanding.    Thank you for allowing me to participate in the care of your patient.  Please do not hesitate to call with any questions or concerns.  We will keep you aware of any further encounters with Kaelyn Conte.      Sincerely Yours,      HUMBLE Gu

## 2021-11-18 ENCOUNTER — TRANSCRIBE ORDERS (OUTPATIENT)
Dept: LAB | Facility: HOSPITAL | Age: 71
End: 2021-11-18

## 2021-11-18 DIAGNOSIS — E11.69 TYPE 2 DIABETES MELLITUS WITH OTHER SPECIFIED COMPLICATION, UNSPECIFIED WHETHER LONG TERM INSULIN USE (HCC): Primary | ICD-10-CM

## 2021-11-18 DIAGNOSIS — E78.5 HYPERLIPIDEMIA, UNSPECIFIED HYPERLIPIDEMIA TYPE: ICD-10-CM

## 2021-11-19 ENCOUNTER — LAB (OUTPATIENT)
Dept: LAB | Facility: HOSPITAL | Age: 71
End: 2021-11-19

## 2021-11-19 DIAGNOSIS — E11.69 TYPE 2 DIABETES MELLITUS WITH OTHER SPECIFIED COMPLICATION, UNSPECIFIED WHETHER LONG TERM INSULIN USE (HCC): ICD-10-CM

## 2021-11-19 DIAGNOSIS — E78.5 HYPERLIPIDEMIA, UNSPECIFIED HYPERLIPIDEMIA TYPE: ICD-10-CM

## 2021-11-19 LAB
ALBUMIN SERPL-MCNC: 4.4 G/DL (ref 3.5–5.2)
ALBUMIN/GLOB SERPL: 2.2 G/DL
ALP SERPL-CCNC: 112 U/L (ref 39–117)
ALT SERPL W P-5'-P-CCNC: 8 U/L (ref 1–33)
ANION GAP SERPL CALCULATED.3IONS-SCNC: 7.5 MMOL/L (ref 5–15)
AST SERPL-CCNC: 18 U/L (ref 1–32)
BILIRUB SERPL-MCNC: 0.2 MG/DL (ref 0–1.2)
BUN SERPL-MCNC: 23 MG/DL (ref 8–23)
BUN/CREAT SERPL: 30.3 (ref 7–25)
CALCIUM SPEC-SCNC: 9.4 MG/DL (ref 8.6–10.5)
CHLORIDE SERPL-SCNC: 103 MMOL/L (ref 98–107)
CHOLEST SERPL-MCNC: 168 MG/DL (ref 0–200)
CO2 SERPL-SCNC: 27.5 MMOL/L (ref 22–29)
CREAT SERPL-MCNC: 0.76 MG/DL (ref 0.57–1)
GFR SERPL CREATININE-BSD FRML MDRD: 75 ML/MIN/1.73
GLOBULIN UR ELPH-MCNC: 2 GM/DL
GLUCOSE SERPL-MCNC: 166 MG/DL (ref 65–99)
HBA1C MFR BLD: 7.69 % (ref 4.8–5.6)
HDLC SERPL-MCNC: 41 MG/DL (ref 40–60)
LDLC SERPL CALC-MCNC: 99 MG/DL (ref 0–100)
LDLC/HDLC SERPL: 2.32 {RATIO}
POTASSIUM SERPL-SCNC: 4.5 MMOL/L (ref 3.5–5.2)
PROT SERPL-MCNC: 6.4 G/DL (ref 6–8.5)
SODIUM SERPL-SCNC: 138 MMOL/L (ref 136–145)
TRIGL SERPL-MCNC: 159 MG/DL (ref 0–150)
VLDLC SERPL-MCNC: 28 MG/DL (ref 5–40)

## 2021-11-19 PROCEDURE — 80053 COMPREHEN METABOLIC PANEL: CPT

## 2021-11-19 PROCEDURE — 36415 COLL VENOUS BLD VENIPUNCTURE: CPT

## 2021-11-19 PROCEDURE — 80061 LIPID PANEL: CPT

## 2021-11-19 PROCEDURE — 83036 HEMOGLOBIN GLYCOSYLATED A1C: CPT

## 2022-03-28 ENCOUNTER — LAB (OUTPATIENT)
Dept: LAB | Facility: HOSPITAL | Age: 72
End: 2022-03-28

## 2022-03-28 ENCOUNTER — TRANSCRIBE ORDERS (OUTPATIENT)
Dept: LAB | Facility: HOSPITAL | Age: 72
End: 2022-03-28

## 2022-03-28 DIAGNOSIS — E11.65 TYPE II DIABETES MELLITUS WITH HYPEROSMOLARITY, UNCONTROLLED: ICD-10-CM

## 2022-03-28 DIAGNOSIS — I10 ESSENTIAL HYPERTENSION, MALIGNANT: ICD-10-CM

## 2022-03-28 DIAGNOSIS — E78.5 HYPERLIPIDEMIA, UNSPECIFIED HYPERLIPIDEMIA TYPE: ICD-10-CM

## 2022-03-28 DIAGNOSIS — E11.00 TYPE II DIABETES MELLITUS WITH HYPEROSMOLARITY, UNCONTROLLED: ICD-10-CM

## 2022-03-28 DIAGNOSIS — E11.00 TYPE II DIABETES MELLITUS WITH HYPEROSMOLARITY, UNCONTROLLED: Primary | ICD-10-CM

## 2022-03-28 DIAGNOSIS — E11.65 TYPE II DIABETES MELLITUS WITH HYPEROSMOLARITY, UNCONTROLLED: Primary | ICD-10-CM

## 2022-03-28 LAB
ALBUMIN SERPL-MCNC: 4 G/DL (ref 3.5–5.2)
ALBUMIN/GLOB SERPL: 1.5 G/DL
ALP SERPL-CCNC: 99 U/L (ref 39–117)
ALT SERPL W P-5'-P-CCNC: 13 U/L (ref 1–33)
ANION GAP SERPL CALCULATED.3IONS-SCNC: 13.6 MMOL/L (ref 5–15)
AST SERPL-CCNC: 15 U/L (ref 1–32)
BILIRUB SERPL-MCNC: 0.4 MG/DL (ref 0–1.2)
BUN SERPL-MCNC: 18 MG/DL (ref 8–23)
BUN/CREAT SERPL: 25 (ref 7–25)
CALCIUM SPEC-SCNC: 9.6 MG/DL (ref 8.6–10.5)
CHLORIDE SERPL-SCNC: 102 MMOL/L (ref 98–107)
CHOLEST SERPL-MCNC: 195 MG/DL (ref 0–200)
CO2 SERPL-SCNC: 27.4 MMOL/L (ref 22–29)
CREAT SERPL-MCNC: 0.72 MG/DL (ref 0.57–1)
EGFRCR SERPLBLD CKD-EPI 2021: 89 ML/MIN/1.73
GLOBULIN UR ELPH-MCNC: 2.7 GM/DL
GLUCOSE SERPL-MCNC: 168 MG/DL (ref 65–99)
HBA1C MFR BLD: 7.6 % (ref 4.8–5.6)
HDLC SERPL-MCNC: 50 MG/DL (ref 40–60)
LDLC SERPL CALC-MCNC: 118 MG/DL (ref 0–100)
LDLC/HDLC SERPL: 2.3 {RATIO}
POTASSIUM SERPL-SCNC: 4.4 MMOL/L (ref 3.5–5.2)
PROT SERPL-MCNC: 6.7 G/DL (ref 6–8.5)
SODIUM SERPL-SCNC: 143 MMOL/L (ref 136–145)
TRIGL SERPL-MCNC: 151 MG/DL (ref 0–150)
VLDLC SERPL-MCNC: 27 MG/DL (ref 5–40)

## 2022-03-28 PROCEDURE — 36415 COLL VENOUS BLD VENIPUNCTURE: CPT

## 2022-03-28 PROCEDURE — 80061 LIPID PANEL: CPT

## 2022-03-28 PROCEDURE — 80053 COMPREHEN METABOLIC PANEL: CPT

## 2022-03-28 PROCEDURE — 83036 HEMOGLOBIN GLYCOSYLATED A1C: CPT

## 2022-03-29 ENCOUNTER — TRANSCRIBE ORDERS (OUTPATIENT)
Dept: ADMINISTRATIVE | Facility: HOSPITAL | Age: 72
End: 2022-03-29

## 2022-03-29 DIAGNOSIS — Z12.31 ENCOUNTER FOR SCREENING MAMMOGRAM FOR MALIGNANT NEOPLASM OF BREAST: Primary | ICD-10-CM

## 2022-03-31 ENCOUNTER — TRANSCRIBE ORDERS (OUTPATIENT)
Dept: ADMINISTRATIVE | Facility: HOSPITAL | Age: 72
End: 2022-03-31

## 2022-03-31 ENCOUNTER — HOSPITAL ENCOUNTER (OUTPATIENT)
Dept: GENERAL RADIOLOGY | Facility: HOSPITAL | Age: 72
Discharge: HOME | End: 2022-03-31
Admitting: PHYSICIAN ASSISTANT

## 2022-03-31 DIAGNOSIS — M25.561 RIGHT KNEE PAIN, UNSPECIFIED CHRONICITY: Primary | ICD-10-CM

## 2022-03-31 DIAGNOSIS — M25.561 RIGHT KNEE PAIN, UNSPECIFIED CHRONICITY: ICD-10-CM

## 2022-03-31 PROCEDURE — 73562 X-RAY EXAM OF KNEE 3: CPT

## 2022-06-29 NOTE — PROGRESS NOTES
Physical Therapy  Daily Treatment Note  Date: 2021  Patient Name: Richard Richardson  MRN: [id number]     :   1950    Subjective:   General  Chart Reviewed: Yes  Additional Pertinent Hx: 78 y/o F presents with complaints of unsteadiness. Hx DM, osteoporosis, lung ca, breast ca, carotid artery occlusion  Response To Previous Treatment: Patient with no complaints from previous session. Family / Caregiver Present: No  Referring Practitioner: Jami Stewart DO  PT Visit Information  PT Insurance Information: [facility] [employer] Medicare  Total # of Visits Approved: 12  Total # of Visits to Date: 2  Plan of Care/Certification Expiration Date: 21  Progress Note Due Date: 21  Subjective  Subjective: No complaints today. Has had no dizzy spells since evaluation. Pain Screening  Patient Currently in Pain: No  Vital Signs  Patient Currently in Pain: No       Treatment Activities:     Exercises  Exercise 1: Ambulation with horizontal/vertical head turns  Exercise 2: Box steps cw/ccwx 5 ea  Exercise 3: Fwd/back ambulation on line (progress to ball toss) x 3  Exercise 4: Sidestepping on line x 3  Exercise 5: Cone weaves x 3  Exercise 6: Stepping over curbs x 3  Exercise 7: Figure 8s x 10  Exercise 8: 360s x 3 ea  Exercise 9: Repeated sit to stand without UE use x 10 (from black chair)  Exercise 10: Stance on green foam EO/EC/narrow MICHAEL x 1' ea  Exercise 11: Stance on green foam with balance perturbations x 1'  Exercise 12: Tandem stance 2 x 30\" ea  Exercise 13: SLS 2 x 15-20\" ea  Exercise 14: Toe taps on cone x 10 ea in  bars  Exercise 15: Marching on trampoline x 1'  Exercise 16: Ambulation around room picking up targets at varying heights  Exercise 17: Wobble board x 10 F/B and M/L, x 5 ea cw/ccw in  bars  Exercise 18:  Bilat ankle eversion with t-band x 10 ea (green)     Assessment:   Conditions Requiring Skilled Therapeutic Intervention  Body structures, Functions, Activity limitations: Decreased functional mobility ; Decreased balance;Decreased high-level IADLs  Assessment: POC initiated per initial evaluation. Pt tolerated well and difficulty was often increased due to this. Likely ready to add ball toss during balance exercises. Cuing required to slow pace of ex, as she often performs with quick speed. No complaints of pain or fatigue at end of session. Treatment Diagnosis: Unsteadiness  REQUIRES PT FOLLOW UP: Yes  Discharge Recommendations: Continue to assess pending progress     Goals:  Short term goals  Time Frame for Short term goals: 3-4 weeks  Short term goal 1: Independent with HEP  Short term goal 2: Improve bilat ankle eversion to at 5/5. Short term goal 3: Perform at least 13 sit to  30\" from black chair w/o UE use. Long term goals  Time Frame for Long term goals : 4-6 weeks  Long term goal 1: Report improved tolerance (no LOB) while reaching for objects outside MICHAEL during housework. Long term goal 2: Display less noticeable gait deviations during ambulation with head turns. Long term goal 3: Improve Sampson score to at least 53/56. Long term goal 4: Improve DGI score to 23/23.   Patient Goals   Patient goals : improve balance    Plan:    Plan  Times per week: 2x  Plan weeks: 4-6 weeks  Current Treatment Recommendations: Strengthening, Neuromuscular Re-education, Home Exercise Program, Safety Education & Training, Balance Training, Patient/Caregiver Education & Training  Timed Code Treatment Minutes: 55 Minutes     Therapy Time   Individual Concurrent Group Co-treatment   Time In 1003         Time Out 1058         Minutes 55         Timed Code Treatment Minutes: 2[address] Daniel Hansen, PT show

## 2022-07-19 ENCOUNTER — TRANSCRIBE ORDERS (OUTPATIENT)
Dept: ADMINISTRATIVE | Facility: HOSPITAL | Age: 72
End: 2022-07-19

## 2022-07-19 ENCOUNTER — LAB (OUTPATIENT)
Dept: LAB | Facility: HOSPITAL | Age: 72
End: 2022-07-19

## 2022-07-19 DIAGNOSIS — M25.561 RIGHT KNEE PAIN, UNSPECIFIED CHRONICITY: ICD-10-CM

## 2022-07-19 DIAGNOSIS — E11.9 DIABETES MELLITUS WITHOUT COMPLICATION: Primary | ICD-10-CM

## 2022-07-19 DIAGNOSIS — E78.5 HYPERLIPIDEMIA, UNSPECIFIED HYPERLIPIDEMIA TYPE: ICD-10-CM

## 2022-07-19 DIAGNOSIS — E11.9 DIABETES MELLITUS WITHOUT COMPLICATION: ICD-10-CM

## 2022-07-19 LAB
ALBUMIN SERPL-MCNC: 4.2 G/DL (ref 3.5–5.2)
ALBUMIN/GLOB SERPL: 1.4 G/DL
ALP SERPL-CCNC: 94 U/L (ref 39–117)
ALT SERPL W P-5'-P-CCNC: 14 U/L (ref 1–33)
ANION GAP SERPL CALCULATED.3IONS-SCNC: 11.1 MMOL/L (ref 5–15)
AST SERPL-CCNC: 17 U/L (ref 1–32)
BILIRUB SERPL-MCNC: 0.4 MG/DL (ref 0–1.2)
BUN SERPL-MCNC: 16 MG/DL (ref 8–23)
BUN/CREAT SERPL: 20.3 (ref 7–25)
CALCIUM SPEC-SCNC: 9.5 MG/DL (ref 8.6–10.5)
CHLORIDE SERPL-SCNC: 103 MMOL/L (ref 98–107)
CHOLEST SERPL-MCNC: 123 MG/DL (ref 0–200)
CO2 SERPL-SCNC: 26.9 MMOL/L (ref 22–29)
CREAT SERPL-MCNC: 0.79 MG/DL (ref 0.57–1)
EGFRCR SERPLBLD CKD-EPI 2021: 79.6 ML/MIN/1.73
GLOBULIN UR ELPH-MCNC: 3 GM/DL
GLUCOSE SERPL-MCNC: 177 MG/DL (ref 65–99)
HBA1C MFR BLD: 7.8 % (ref 4.8–5.6)
HDLC SERPL-MCNC: 51 MG/DL (ref 40–60)
LDLC SERPL CALC-MCNC: 52 MG/DL (ref 0–100)
LDLC/HDLC SERPL: 0.97 {RATIO}
POTASSIUM SERPL-SCNC: 4.4 MMOL/L (ref 3.5–5.2)
PROT SERPL-MCNC: 7.2 G/DL (ref 6–8.5)
SODIUM SERPL-SCNC: 141 MMOL/L (ref 136–145)
TRIGL SERPL-MCNC: 113 MG/DL (ref 0–150)
VLDLC SERPL-MCNC: 20 MG/DL (ref 5–40)

## 2022-07-19 PROCEDURE — 80061 LIPID PANEL: CPT

## 2022-07-19 PROCEDURE — 83036 HEMOGLOBIN GLYCOSYLATED A1C: CPT

## 2022-07-19 PROCEDURE — 80053 COMPREHEN METABOLIC PANEL: CPT

## 2022-07-19 PROCEDURE — 36415 COLL VENOUS BLD VENIPUNCTURE: CPT

## 2022-07-26 ENCOUNTER — HOSPITAL ENCOUNTER (OUTPATIENT)
Dept: MAMMOGRAPHY | Facility: HOSPITAL | Age: 72
Discharge: HOME | End: 2022-07-26
Admitting: PHYSICIAN ASSISTANT

## 2022-07-26 DIAGNOSIS — Z12.31 ENCOUNTER FOR SCREENING MAMMOGRAM FOR MALIGNANT NEOPLASM OF BREAST: ICD-10-CM

## 2022-07-26 PROCEDURE — 77067 SCR MAMMO BI INCL CAD: CPT

## 2022-07-26 PROCEDURE — 77063 BREAST TOMOSYNTHESIS BI: CPT

## 2022-09-01 ENCOUNTER — HOSPITAL ENCOUNTER (OUTPATIENT)
Dept: SLEEP CENTER | Age: 72
Discharge: HOME | End: 2022-09-03
Payer: MEDICARE

## 2022-09-01 PROCEDURE — 95811 POLYSOM 6/>YRS CPAP 4/> PARM: CPT

## 2022-09-02 NOTE — PROGRESS NOTES
[place] [address]  [place], [address]  Phone [phone] Fax [phone]     Sleep Study Technician Review    Patient Name:  Priscila Warner  :   1950  Referring Provider: Jess Oh,*    Brief History:  Priscila Warner is a 67 y.o. Female with a history of hypertension and diabetes. Patient states she had sleep study done at home and doubts accuracy of test. She continues to be fatigued. Patient complains of snoring and daytime sleepiness. Height:  63\"  Weight: 149 lbs  BMI: 26.4  Neck Circ: 14\"  Mallampati 3  ESS: 5    Type of Study: PSG  Time Stage Position Snore Hypopnea Obs Apnea Hoang Apnea PAP O2   2200 Awake Supine No No No No  RA   2300 2 Supine No Yes Yes No  RA   2400 2 Right Yes Yes Yes Yes  RA   0100 2 Left Yes Yes Yes Yes CPAP 6 RA   0200 3 Left No Yes No Yes CPAP 8 RA   0300 2 Left No No No No CPAP 8 RA   0400 REM Left No No No No CPAP 8 RA     Summary: Patient qualified for a split study and was placed on CPAP at 00:35. Patient had no complaints during the night. DME: [facility]   Final PAP settings: CPAP 8  Mask Type: Nasal   Mask: Eson    Mask Size: Small    The study was reviewed briefly with Priscila Warner. She will be notified of the formal results and recommendations after the study is scored and interpreted. The report will be sent to her referring provider. Technician: Shannon RALPHLIE [facility] [place] RRT.  LEONARD

## 2022-09-25 DIAGNOSIS — G47.33 SLEEP APNEA, OBSTRUCTIVE: Primary | ICD-10-CM

## 2022-09-25 PROCEDURE — 95811 POLYSOM 6/>YRS CPAP 4/> PARM: CPT | Performed by: PSYCHIATRY & NEUROLOGY

## 2022-09-26 NOTE — PROGRESS NOTES
[address] [place] kahn  Phone [phone] Fax [phone]     Patient Name: Kurt Rowland 2022  : 1950  Age: 67 y.o.   Patient Address: [address] [place] [address]       Patient Phone: [phone] (home)     REFERRAL  Referred to: DME provider of patient's choice  Kurt Rowland is referred for the following:    DME Equipment HPCPS Code Setting   CPAP device with flex or comparable pressure relief per comfort  8cm   Heated Humidifier  Patient Choice       Replinishible PAP Supplies, 1 year supply  Item HPCPS Code Frequency   Mask of choice  or  1 per 3 months   Nasal Mask cushion/pillows  or  2 per 30 days   Full Face Mask Interface  1 per 30 days   Headgear  1 per 6 months   Tubing, length of choice  or  1 per 3 months   Water Chamber  1 per 6 months   Chinstrap  1 per 6 months   Disposable Filters  2 per 30 days   Reusable Filters  1 per 6 months     Diagnoses:  Obstructive sleep apnea (G47.33)  Length of Need: Lifetime, 99    Ordering Provider: TAMEKA Heard Remedies: [id number]         Signature:       Date: 2022      Electronically Signed by Vishal Quinteros M.D.

## 2022-09-26 NOTE — PROCEDURES
[place] [address]  [place]  Phone [phone] Fax [phone]     Final Polysomnogram Report    Patient Name Howard Lorenzo Account Number [de-identified]    1950 Referring Provider Dayanna Aceves,    Age/ Gender 67 years/F Interpreting physician Lesley Blackburn M.D., [facility]   Neck circumference/  Mallampati classification 14.5 in/class 3 Night Technician Reena Ramires RRT, RPSGT   Brilliant score 5/24 Scoring Technician Eliza Hernandez RRT,RPSGT   Height 63.0 in Indications for the test Obstructive Sleep Apnea   Weight 149.0 lbs Test Split Night Polysomnogram   BMI 26.4 Date of test 2022         Diagnoses:    Obstructive Sleep Apnea (G47.33), AHI of 26.5        Recommendations:    Use of CPAP during sleep at 8cm. She will be set up with equipment by the [employer] company of her choice and a sleep clinic appointment as been arranged. II. Ms. Mai Haas may benefit from weight reduction. III. She should avoid driving or operating heavy equipment when drowsy and the use of respiratory suppressants. Lesley Blackburn M.D., [facility]         Board certified sleep physician      Final Polysomnogram Report        History:    Howard Haas is a 67year old, 63.0 inch tall, 149.0 pound (BMI 26.4) woman with snoring and witnessed apneas who was referred for a polysomnogram.  She had a home sleep test in the past and was diagnosed with obstructive sleep apnea. She was set up on an auto adjusting CPAP. She has had difficulty using the device. Her medical history is significant for hypertension, diabetes, right carotid stenosis, breast cancer, and lung cancer. Summary of the Diagnostic Portion of the Polysomnogram:    The initial portion of the polysomnogram performed on 2022 showed obstructive sleep apnea with an apnea and hypopnea index of 26.5 events per hour.   She had oxygen desaturations as low as 83% and spent 8.7% of the recorded time with an oxygen saturation less than 90%. Periodic limb movements were noted with an index of 26.5 events per hour and a PLMS with arousal index was 0.8. Her EKG showed normal sinus rhythm. Please see the data sheets for details. Summary of the Titration Portion of the Polysomnogram:    Ms. Juany Woodruff was started on CPAP later in the polysomnogram.  She did very well and at a pressure of 8 cm, her apneas and hypopneas were eliminated. The apnea and hypopnea index on the final pressure was 0.0 events per hour. The lowest oxygen saturation was 94% on the final settings. Periodic limb movements were noted with an index of 1.1 events per hour and a PLMS with arousal index was 0. Her EKG showed normal sinus rhythm. Please see the data sheets for details.                                Rolanda Vivar M.D., [facility]         Board certified sleep physician

## 2022-10-07 ENCOUNTER — APPOINTMENT (OUTPATIENT)
Dept: ULTRASOUND IMAGING | Facility: HOSPITAL | Age: 72
End: 2022-10-07

## 2022-10-10 ENCOUNTER — TELEPHONE (OUTPATIENT)
Dept: VASCULAR SURGERY | Facility: CLINIC | Age: 72
End: 2022-10-10

## 2022-10-10 NOTE — TELEPHONE ENCOUNTER
Left a message for the patient to call back to let her know that we had to schedule her for testing tomorrow morning.  [facility] and then follow up in the office at 11:15 AM.  (Pt was supposed to have testing, but it didn't get rescheduled with this appt).

## 2022-10-11 ENCOUNTER — HOSPITAL ENCOUNTER (OUTPATIENT)
Dept: ULTRASOUND IMAGING | Facility: HOSPITAL | Age: 72
Discharge: HOME | End: 2022-10-11
Admitting: NURSE PRACTITIONER

## 2022-10-11 ENCOUNTER — OFFICE VISIT (OUTPATIENT)
Dept: VASCULAR SURGERY | Facility: CLINIC | Age: 72
End: 2022-10-11

## 2022-10-11 VITALS
HEART RATE: 65 BPM | HEIGHT: 63 IN | DIASTOLIC BLOOD PRESSURE: 62 MMHG | SYSTOLIC BLOOD PRESSURE: 110 MMHG | OXYGEN SATURATION: 94 % | WEIGHT: 150 LBS | BODY MASS INDEX: 26.58 KG/M2

## 2022-10-11 DIAGNOSIS — E11.9 TYPE 2 DIABETES MELLITUS WITHOUT COMPLICATION, WITHOUT LONG-TERM CURRENT USE OF INSULIN: ICD-10-CM

## 2022-10-11 DIAGNOSIS — I65.23 BILATERAL CAROTID ARTERY STENOSIS: ICD-10-CM

## 2022-10-11 DIAGNOSIS — I65.23 BILATERAL CAROTID ARTERY STENOSIS: Primary | ICD-10-CM

## 2022-10-11 PROCEDURE — 93880 EXTRACRANIAL BILAT STUDY: CPT | Performed by: SURGERY

## 2022-10-11 PROCEDURE — 99213 OFFICE O/P EST LOW 20 MIN: CPT | Performed by: SURGERY

## 2022-10-11 PROCEDURE — 93880 EXTRACRANIAL BILAT STUDY: CPT

## 2022-10-11 RX ORDER — ATORVASTATIN CALCIUM 10 MG/1
TABLET, FILM COATED ORAL
COMMUNITY
Start: 2022-09-22

## 2022-10-11 RX ORDER — GLIPIZIDE 2.5 MG/1
2.5 TABLET, EXTENDED RELEASE ORAL DAILY
COMMUNITY
Start: 2022-07-21

## 2022-10-11 RX ORDER — CALCIUM CITRATE/VITAMIN D3 200MG-6.25
TABLET ORAL
COMMUNITY
Start: 2022-07-12

## 2022-10-11 NOTE — PROGRESS NOTES
"10/11/2022       Komal Laws DO  [address]  [facility] [address]        Kaelyn Conte  1950    Chief Complaint   Patient presents with   • Follow-up     1 yr f/u with carotid testing.  Last seen in the office on 10/21/21.  Pt denies any stroke like symptoms.        Dear Komal Laws DO:    HPI     I had the pleasure of seeing you patient in the office today for follow up.  As you recall, the patient is a 72 y.o. female who we are following for asymptomatic carotid occlusive disease.  Currently, she is doing well and denies any strokelike symptoms.  She denies any claudication to her lower extremities.  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  She is maintained on aspirin and Lipitor.  She did have noninvasive testing performed today, which I did review in office.     Review of Systems   Constitutional: Negative.    HENT: Negative.    Eyes: Negative.    Respiratory: Negative.    Cardiovascular: Negative.    Gastrointestinal: Negative.    Endocrine: Negative.    Genitourinary: Negative.    Musculoskeletal: Negative.    Skin: Negative.    Allergic/Immunologic: Negative.    Neurological: Negative.    Hematological: Negative.    Psychiatric/Behavioral: Negative.         /62   Pulse 65   Ht 160 cm (63\")   Wt 68 kg (150 lb)   LMP  (LMP Unknown)   SpO2 94%   BMI 26.57 kg/m²   Physical Exam  Vitals and nursing note reviewed.   Constitutional:       General: She is not in acute distress.     Appearance: Normal appearance. She is well-developed. She is not diaphoretic.   HENT:      Head: Normocephalic and atraumatic.   Eyes:      General: No scleral icterus.     Pupils: Pupils are equal, round, and reactive to light.   Neck:      Thyroid: No thyromegaly.      Vascular: No carotid bruit or JVD.   Cardiovascular:      Rate and Rhythm: Normal rate and regular rhythm.      Pulses: Normal pulses.      Heart sounds: Normal heart sounds and S2 normal. No murmur " heard.    No friction rub. No gallop.   Pulmonary:      Effort: Pulmonary effort is normal.      Breath sounds: Normal breath sounds.   Abdominal:      General: Bowel sounds are normal.      Palpations: Abdomen is soft.   Musculoskeletal:         General: Normal range of motion.      Cervical back: Normal range of motion and neck supple.   Skin:     General: Skin is warm and dry.   Neurological:      General: No focal deficit present.      Mental Status: She is alert and oriented to person, place, and time.      Cranial Nerves: No cranial nerve deficit.   Psychiatric:         Mood and Affect: Mood normal.         Behavior: Behavior normal.         Thought Content: Thought content normal.         Judgment: Judgment normal.           DIAGNOSTIC DATA:  Noninvasive testing including a carotid duplex shows less than 50% carotid stenosis bilaterally with bilateral antegrade vertebral flow.      Patient Active Problem List   Diagnosis   • Diabetes mellitus (HCC)   • Carotid stenosis, asymptomatic   • Venous (peripheral) insufficiency   • Hx of colonic polyps   • Family history of colon cancer         ICD-10-CM ICD-9-CM   1. Bilateral carotid artery stenosis  I65.23 433.10     433.30   2. Type 2 diabetes mellitus without complication, without long-term current use of insulin (HCC)  E11.9 250.00       PLAN: After thoroughly evaluating Kaelyn Conte, I believe the best course of action is to remain conservative from vascular surgery standpoint.  Currently she is doing well denies any strokelike symptoms.  She also denies any claudication to her lower extremities.  I did review her previous testing which shows less than 50% carotid stenosis bilaterally.  We will see her back in 1 year with repeat noninvasive testing for continued surveillance, including a carotid duplex.  She can continue with compression stockings to aid with her swelling.  I did discuss vascular risk factors as they pertain to the progression of  vascular disease including controlling her diabetes.  Her most recent hemoglobin A1c is uncontrolled at 7.8.  The patient is to continue taking their medications as previously discussed.   This was all discussed in full with complete understanding.    Thank you for allowing me to participate in the care of your patient.  Please do not hesitate to call with any questions or concerns.  We will keep you aware of any further encounters with Kaelyn Conte.      Sincerely Yours,      HUMBLE Gu

## 2022-10-24 ENCOUNTER — LAB (OUTPATIENT)
Dept: LAB | Facility: HOSPITAL | Age: 72
End: 2022-10-24

## 2022-10-24 ENCOUNTER — TRANSCRIBE ORDERS (OUTPATIENT)
Dept: ADMINISTRATIVE | Facility: HOSPITAL | Age: 72
End: 2022-10-24

## 2022-10-24 DIAGNOSIS — E11.00 TYPE II DIABETES MELLITUS WITH HYPEROSMOLARITY, UNCONTROLLED: Primary | ICD-10-CM

## 2022-10-24 DIAGNOSIS — E11.65 TYPE II DIABETES MELLITUS WITH HYPEROSMOLARITY, UNCONTROLLED: Primary | ICD-10-CM

## 2022-10-24 DIAGNOSIS — E78.5 HYPERLIPIDEMIA, UNSPECIFIED HYPERLIPIDEMIA TYPE: ICD-10-CM

## 2022-10-24 DIAGNOSIS — E11.00 TYPE II DIABETES MELLITUS WITH HYPEROSMOLARITY, UNCONTROLLED: ICD-10-CM

## 2022-10-24 DIAGNOSIS — E11.65 TYPE II DIABETES MELLITUS WITH HYPEROSMOLARITY, UNCONTROLLED: ICD-10-CM

## 2022-10-24 LAB
ALBUMIN SERPL-MCNC: 4.1 G/DL (ref 3.5–5.2)
ALBUMIN UR-MCNC: <1.2 MG/DL
ALBUMIN/GLOB SERPL: 1.9 G/DL
ALP SERPL-CCNC: 115 U/L (ref 39–117)
ALT SERPL W P-5'-P-CCNC: 15 U/L (ref 1–33)
ANION GAP SERPL CALCULATED.3IONS-SCNC: 9.5 MMOL/L (ref 5–15)
AST SERPL-CCNC: 19 U/L (ref 1–32)
BILIRUB SERPL-MCNC: 0.3 MG/DL (ref 0–1.2)
BUN SERPL-MCNC: 15 MG/DL (ref 8–23)
BUN/CREAT SERPL: 24.2 (ref 7–25)
CALCIUM SPEC-SCNC: 8.7 MG/DL (ref 8.6–10.5)
CHLORIDE SERPL-SCNC: 104 MMOL/L (ref 98–107)
CHOLEST SERPL-MCNC: 110 MG/DL (ref 0–200)
CO2 SERPL-SCNC: 26.5 MMOL/L (ref 22–29)
CREAT SERPL-MCNC: 0.62 MG/DL (ref 0.57–1)
CREAT UR-MCNC: 37.9 MG/DL
DEPRECATED RDW RBC AUTO: 40 FL (ref 37–54)
EGFRCR SERPLBLD CKD-EPI 2021: 94.8 ML/MIN/1.73
ERYTHROCYTE [DISTWIDTH] IN BLOOD BY AUTOMATED COUNT: 11.8 % (ref 12.3–15.4)
GLOBULIN UR ELPH-MCNC: 2.2 GM/DL
GLUCOSE SERPL-MCNC: 178 MG/DL (ref 65–99)
HBA1C MFR BLD: 7.6 % (ref 4.8–5.6)
HCT VFR BLD AUTO: 39 % (ref 34–46.6)
HDLC SERPL-MCNC: 48 MG/DL (ref 40–60)
HGB BLD-MCNC: 13 G/DL (ref 12–15.9)
LDLC SERPL CALC-MCNC: 48 MG/DL (ref 0–100)
LDLC/HDLC SERPL: 1.01 {RATIO}
MCH RBC QN AUTO: 31.2 PG (ref 26.6–33)
MCHC RBC AUTO-ENTMCNC: 33.3 G/DL (ref 31.5–35.7)
MCV RBC AUTO: 93.5 FL (ref 79–97)
MICROALBUMIN/CREAT UR: NORMAL MG/G{CREAT}
PLATELET # BLD AUTO: 240 10*3/MM3 (ref 140–450)
PMV BLD AUTO: 10.4 FL (ref 6–12)
POTASSIUM SERPL-SCNC: 4.6 MMOL/L (ref 3.5–5.2)
PROT SERPL-MCNC: 6.3 G/DL (ref 6–8.5)
RBC # BLD AUTO: 4.17 10*6/MM3 (ref 3.77–5.28)
SODIUM SERPL-SCNC: 140 MMOL/L (ref 136–145)
T3FREE SERPL-MCNC: 2.78 PG/ML (ref 2–4.4)
T4 FREE SERPL-MCNC: 1.09 NG/DL (ref 0.93–1.7)
TRIGL SERPL-MCNC: 67 MG/DL (ref 0–150)
TSH SERPL DL<=0.05 MIU/L-ACNC: 1.85 UIU/ML (ref 0.27–4.2)
URATE SERPL-MCNC: 4.7 MG/DL (ref 2.4–5.7)
VLDLC SERPL-MCNC: 14 MG/DL (ref 5–40)
WBC NRBC COR # BLD: 6.13 10*3/MM3 (ref 3.4–10.8)

## 2022-10-24 PROCEDURE — 85027 COMPLETE CBC AUTOMATED: CPT

## 2022-10-24 PROCEDURE — 80061 LIPID PANEL: CPT

## 2022-10-24 PROCEDURE — 84550 ASSAY OF BLOOD/URIC ACID: CPT

## 2022-10-24 PROCEDURE — 83036 HEMOGLOBIN GLYCOSYLATED A1C: CPT

## 2022-10-24 PROCEDURE — 80053 COMPREHEN METABOLIC PANEL: CPT

## 2022-10-24 PROCEDURE — 82570 ASSAY OF URINE CREATININE: CPT

## 2022-10-24 PROCEDURE — 84439 ASSAY OF FREE THYROXINE: CPT

## 2022-10-24 PROCEDURE — 84481 FREE ASSAY (FT-3): CPT

## 2022-10-24 PROCEDURE — 36415 COLL VENOUS BLD VENIPUNCTURE: CPT

## 2022-10-24 PROCEDURE — 82043 UR ALBUMIN QUANTITATIVE: CPT

## 2022-10-24 PROCEDURE — 84443 ASSAY THYROID STIM HORMONE: CPT

## 2023-03-31 ENCOUNTER — LAB (OUTPATIENT)
Dept: LAB | Facility: HOSPITAL | Age: 73
End: 2023-03-31
Payer: MEDICARE

## 2023-03-31 ENCOUNTER — TRANSCRIBE ORDERS (OUTPATIENT)
Dept: ADMINISTRATIVE | Facility: HOSPITAL | Age: 73
End: 2023-03-31
Payer: MEDICARE

## 2023-03-31 DIAGNOSIS — E78.5 HYPERLIPIDEMIA, UNSPECIFIED HYPERLIPIDEMIA TYPE: ICD-10-CM

## 2023-03-31 DIAGNOSIS — E11.65 TYPE 2 DIABETES MELLITUS WITH HYPERGLYCEMIA, UNSPECIFIED WHETHER LONG TERM INSULIN USE: ICD-10-CM

## 2023-03-31 DIAGNOSIS — E11.69 TYPE 2 DIABETES MELLITUS WITH OTHER SPECIFIED COMPLICATION, UNSPECIFIED WHETHER LONG TERM INSULIN USE: ICD-10-CM

## 2023-03-31 DIAGNOSIS — E11.65 TYPE 2 DIABETES MELLITUS WITH HYPERGLYCEMIA, UNSPECIFIED WHETHER LONG TERM INSULIN USE: Primary | ICD-10-CM

## 2023-03-31 LAB
ALBUMIN SERPL-MCNC: 4.4 G/DL (ref 3.5–5.2)
ALBUMIN/GLOB SERPL: 1.8 G/DL
ALP SERPL-CCNC: 99 U/L (ref 39–117)
ALT SERPL W P-5'-P-CCNC: 13 U/L (ref 1–33)
ANION GAP SERPL CALCULATED.3IONS-SCNC: 10 MMOL/L (ref 5–15)
AST SERPL-CCNC: 18 U/L (ref 1–32)
BILIRUB SERPL-MCNC: 0.5 MG/DL (ref 0–1.2)
BUN SERPL-MCNC: 20 MG/DL (ref 8–23)
BUN/CREAT SERPL: 30.8 (ref 7–25)
CALCIUM SPEC-SCNC: 9.4 MG/DL (ref 8.6–10.5)
CHLORIDE SERPL-SCNC: 101 MMOL/L (ref 98–107)
CO2 SERPL-SCNC: 28 MMOL/L (ref 22–29)
CREAT SERPL-MCNC: 0.65 MG/DL (ref 0.57–1)
EGFRCR SERPLBLD CKD-EPI 2021: 93.1 ML/MIN/1.73
GLOBULIN UR ELPH-MCNC: 2.5 GM/DL
GLUCOSE SERPL-MCNC: 208 MG/DL (ref 65–99)
HBA1C MFR BLD: 7.7 % (ref 4.8–5.6)
POTASSIUM SERPL-SCNC: 4.2 MMOL/L (ref 3.5–5.2)
PROT SERPL-MCNC: 6.9 G/DL (ref 6–8.5)
SODIUM SERPL-SCNC: 139 MMOL/L (ref 136–145)

## 2023-03-31 PROCEDURE — 83036 HEMOGLOBIN GLYCOSYLATED A1C: CPT

## 2023-03-31 PROCEDURE — 36415 COLL VENOUS BLD VENIPUNCTURE: CPT

## 2023-03-31 PROCEDURE — 80053 COMPREHEN METABOLIC PANEL: CPT

## 2023-08-21 ENCOUNTER — TRANSCRIBE ORDERS (OUTPATIENT)
Dept: ADMINISTRATIVE | Facility: HOSPITAL | Age: 73
End: 2023-08-21
Payer: MEDICARE

## 2023-08-21 DIAGNOSIS — Z78.0 ASYMPTOMATIC MENOPAUSAL STATE: ICD-10-CM

## 2023-08-21 DIAGNOSIS — M85.9 DISORDER OF BONE DENSITY AND STRUCTURE, UNSPECIFIED: Primary | ICD-10-CM

## 2023-08-21 DIAGNOSIS — Z12.31 SCREENING MAMMOGRAM, ENCOUNTER FOR: ICD-10-CM

## 2023-09-11 ENCOUNTER — HOSPITAL ENCOUNTER (OUTPATIENT)
Dept: MAMMOGRAPHY | Facility: HOSPITAL | Age: 73
Discharge: HOME | End: 2023-09-11
Payer: MEDICARE

## 2023-09-11 ENCOUNTER — HOSPITAL ENCOUNTER (OUTPATIENT)
Dept: BONE DENSITY | Facility: HOSPITAL | Age: 73
Discharge: HOME | End: 2023-09-11
Payer: MEDICARE

## 2023-09-11 DIAGNOSIS — Z78.0 ASYMPTOMATIC MENOPAUSAL STATE: ICD-10-CM

## 2023-09-11 DIAGNOSIS — Z12.31 SCREENING MAMMOGRAM, ENCOUNTER FOR: ICD-10-CM

## 2023-09-11 PROCEDURE — 77063 BREAST TOMOSYNTHESIS BI: CPT

## 2023-09-11 PROCEDURE — 77067 SCR MAMMO BI INCL CAD: CPT

## 2023-09-11 PROCEDURE — 77080 DXA BONE DENSITY AXIAL: CPT

## 2023-09-25 ENCOUNTER — TELEPHONE (OUTPATIENT)
Dept: VASCULAR SURGERY | Facility: CLINIC | Age: 73
End: 2023-09-25

## 2023-09-25 ENCOUNTER — OFFICE VISIT (OUTPATIENT)
Dept: GASTROENTEROLOGY | Facility: CLINIC | Age: 73
End: 2023-09-25

## 2023-09-25 VITALS
TEMPERATURE: 97.3 F | HEART RATE: 70 BPM | HEIGHT: 63 IN | DIASTOLIC BLOOD PRESSURE: 60 MMHG | BODY MASS INDEX: 25.52 KG/M2 | SYSTOLIC BLOOD PRESSURE: 116 MMHG | OXYGEN SATURATION: 97 % | WEIGHT: 144 LBS

## 2023-09-25 DIAGNOSIS — R13.19 ESOPHAGEAL DYSPHAGIA: ICD-10-CM

## 2023-09-25 DIAGNOSIS — Z80.0 FAMILY HISTORY OF COLON CANCER: ICD-10-CM

## 2023-09-25 DIAGNOSIS — R05.9 COUGH, UNSPECIFIED TYPE: ICD-10-CM

## 2023-09-25 DIAGNOSIS — Z86.010 HX OF COLONIC POLYPS: Primary | ICD-10-CM

## 2023-09-25 PROBLEM — R13.10 DYSPHAGIA: Status: ACTIVE | Noted: 2023-09-25

## 2023-09-25 PROCEDURE — 1159F MED LIST DOCD IN RCRD: CPT | Performed by: NURSE PRACTITIONER

## 2023-09-25 PROCEDURE — 99214 OFFICE O/P EST MOD 30 MIN: CPT | Performed by: NURSE PRACTITIONER

## 2023-09-25 PROCEDURE — 1160F RVW MEDS BY RX/DR IN RCRD: CPT | Performed by: NURSE PRACTITIONER

## 2023-09-25 RX ORDER — MULTIPLE VITAMINS W/ MINERALS TAB 9MG-400MCG
1 TAB ORAL DAILY
COMMUNITY

## 2023-09-25 RX ORDER — GINGER ROOT/GINGER ROOT EXT 262.5 MG
1 CAPSULE ORAL DAILY
COMMUNITY
Start: 2021-01-04

## 2023-09-25 NOTE — H&P (VIEW-ONLY)
Primary Physician: Komal Laws DO    Chief Complaint   Patient presents with    Colon Cancer Screening     Pt presents today for colon recall-last colon was 8/31/2018; Personal history of hyperplastic polyps; Family history of colon cancer       Subjective     Kaelyn Conte is a 73 y.o. female.    HPI  Personal history of colon polyps  Last colonoscopy 8/31/2018 with diverticulosis of the left colon otherwise examination unremarkable.  Patient has a history of hyperplastic colon polyp in the past.  Patient doing well today with no change in bowel habits to report.  No diarrhea, constipation, abdominal pain or rectal bleeding.    Family history of colon cancer  Son had colorectal cancer at age 42.    Choked with Food/Coughing  Has a lot of coughing while eating. Has had a couple events that she reports getting choked with foods. Reports her acid reflux is under control.  No weight loss.  Last Endoscopy 8/20/2013: moderate gastritis    Past Medical History:   Diagnosis Date    Acid reflux     Allergic rhinitis     Carotid stenosis, asymptomatic     Mild    Diabetes mellitus     Diverticulitis of colon     Diverticulosis     Family history of colon cancer     History of colon polyps     History of ear infections     History of streptococcal sore throat     Meniere's disease     Urinary tract infection        Past Surgical History:   Procedure Laterality Date    COLONOSCOPY N/A 08/31/2018    Diverticulosis in the left colon; The examination was otherwise normal on direct and retroflexion views; No specimens collected; Repeat 5 years    COLONOSCOPY  08/20/2013    Diverticulosis; Otherwise normal; Repeat 10 years    COLONOSCOPY  02/03/2004    Diverticulosis; One 1cm hyperplastic polyp in the transverse colon; Repeat 3 years    ENDOSCOPY  08/20/2013    LA grade A esophagitis; Antran erythema-biopsied    HYSTERECTOMY      INNER EAR SURGERY Left     Had ear surgery during early 20's    OOPHORECTOMY       TONSILLECTOMY          Current Outpatient Medications:     aspirin 81 MG EC tablet, Take 81 mg by mouth daily., Disp: , Rfl:     atorvastatin (LIPITOR) 10 MG tablet, Take 1 tablet by mouth Daily., Disp: , Rfl:     Calcium Carb-Cholecalciferol 600-20 MG-MCG tablet, Take 1 tablet by mouth Daily., Disp: , Rfl:     escitalopram (LEXAPRO) 10 MG tablet, Take 1 tablet by mouth Daily., Disp: , Rfl:     glipizide (GLUCOTROL XL) 5 MG ER tablet, Take 1 tablet by mouth Daily., Disp: , Rfl:     metFORMIN (GLUCOPHAGE) 500 MG tablet, Take 1 tablet by mouth 2 (Two) Times a Day., Disp: , Rfl: 1    multivitamin with minerals (Multivitamin Adults) tablet tablet, Take 1 tablet by mouth Daily., Disp: , Rfl:     True Metrix Blood Glucose Test test strip, , Disp: , Rfl:     No Known Allergies    Social History     Socioeconomic History    Marital status:    Tobacco Use    Smoking status: Never    Smokeless tobacco: Never   Vaping Use    Vaping Use: Never used   Substance and Sexual Activity    Alcohol use: Not Currently    Drug use: No    Sexual activity: Defer       Family History   Problem Relation Age of Onset    Cancer Mother     Breast cancer Mother     Stroke Father     Breast cancer Sister     Esophageal cancer Brother 72    No Known Problems Maternal Aunt     No Known Problems Paternal Aunt     No Known Problems Maternal Grandmother     No Known Problems Paternal Grandmother     No Known Problems Daughter     Rectal cancer Son 42    Colon cancer Son 42    BRCA 1/2 Neg Hx     Endometrial cancer Neg Hx     Ovarian cancer Neg Hx     Colon polyps Neg Hx     Liver cancer Neg Hx     Stomach cancer Neg Hx     Liver disease Neg Hx        Review of Systems   Constitutional:  Negative for unexpected weight change.   Respiratory:  Negative for shortness of breath.    Cardiovascular:  Negative for chest pain.     Objective     /60 (BP Location: Left arm, Patient Position: Sitting, Cuff Size: Adult)   Pulse 70   Temp 97.3 °F  "(36.3 °C) (Infrared)   Ht 160 cm (63\")   Wt 65.3 kg (144 lb)   LMP  (LMP Unknown)   SpO2 97%   Breastfeeding No   BMI 25.51 kg/m²     Physical Exam  Vitals reviewed.   Constitutional:       Appearance: Normal appearance.   Cardiovascular:      Rate and Rhythm: Normal rate and regular rhythm.      Heart sounds: Normal heart sounds.   Pulmonary:      Effort: Pulmonary effort is normal.      Breath sounds: Normal breath sounds.   Neurological:      Mental Status: She is alert.       Lab Results - Last 18 Months   Lab Units 07/14/23  1013 03/31/23  1008 10/24/22  0849 07/19/22  1108 03/28/22  1036   GLUCOSE mg/dL 206* 208* 178* 177* 168*   BUN mg/dL 20 20 15 16 18   CREATININE mg/dL 0.61 0.65 0.62 0.79 0.72   SODIUM mmol/L 140 139 140 141 143   POTASSIUM mmol/L 4.5 4.2 4.6 4.4 4.4   CHLORIDE mmol/L 104 101 104 103 102   CO2 mmol/L 25.0 28.0 26.5 26.9 27.4   TOTAL PROTEIN g/dL 6.5 6.9 6.3 7.2 6.7   ALBUMIN g/dL 4.3 4.4 4.10 4.20 4.00   ALT (SGPT) U/L 16 13 15 14 13   AST (SGOT) U/L 16 18 19 17 15   ALK PHOS U/L 111 99 115 94 99   BILIRUBIN mg/dL 0.4 0.5 0.3 0.4 0.4   GLOBULIN gm/dL 2.2 2.5 2.2 3.0 2.7       Lab Results - Last 18 Months   Lab Units 10/24/22  0849   HEMOGLOBIN g/dL 13.0   HEMATOCRIT % 39.0   MCV fL 93.5   WBC 10*3/mm3 6.13   RDW % 11.8*   MPV fL 10.4   PLATELETS 10*3/mm3 240       Lab Results - Last 18 Months   Lab Units 10/24/22  0849   TSH uIU/mL 1.850          IMPRESSION/PLAN:    Assessment & Plan      Problem List Items Addressed This Visit          Family History    Family history of colon cancer    Overview     Son had colorectal cancer at age 42         Relevant Orders    Case Request (Completed)       Gastrointestinal Abdominal     Hx of colonic polyps - Primary    Overview     History of hyperplastic colon polyp.  Last colonoscopy 8/31/2018 with diverticulosis of the left colon otherwise examination unremarkable.         Relevant Orders    Case Request (Completed)    Case Request (Completed) "    Dysphagia    Overview     Choking with foods on occassional basis, upper esophagus         Relevant Orders    Case Request (Completed)       Pulmonary and Pneumonias    Coughing     Endoscopy and Colonoscopy examination per Dr. Efrain Hsieh Prep          ..The risks, benefits, and alternatives of colonoscopy were reviewed with the patient today.  Risks including perforation of the colon possibly requiring surgery or colostomy.  Additional risks include risk of bleeding from biopsies or removal of colon tissue.  There is also the risk of a drug reaction or problems with anesthesia.  This will be discussed with the further by the anesthesia team on the day of the procedure.  Lastly there is a possibility of missing a colon polyp or cancer.  The benefits include the diagnosis and management of disease of the colon and rectum.  Alternatives to colonoscopy include barium enema, laboratory testing, radiographic evaluation, or no intervention.  The patient verbalizes understanding and agrees.    In accordance with requirements under the Affordable Care Act, [facility] has provided pricing for all hospital services and items on each of its websites. However, a patient's actual cost may differ based on the services the patient receives to meet individual healthcare needs and based on the benefits provided under the patient’s insurance coverage.        Alyson Mari, APRN  09/25/23  15:12 CDT    Part of this note may be an electronic transcription/translation of spoken language to printed text.

## 2023-09-25 NOTE — PROGRESS NOTES
Primary Physician: Komal Laws DO    Chief Complaint   Patient presents with    Colon Cancer Screening     Pt presents today for colon recall-last colon was 8/31/2018; Personal history of hyperplastic polyps; Family history of colon cancer       Subjective     Kaelyn Conte is a 73 y.o. female.    HPI  Personal history of colon polyps  Last colonoscopy 8/31/2018 with diverticulosis of the left colon otherwise examination unremarkable.  Patient has a history of hyperplastic colon polyp in the past.  Patient doing well today with no change in bowel habits to report.  No diarrhea, constipation, abdominal pain or rectal bleeding.    Family history of colon cancer  Son had colorectal cancer at age 42.    Choked with Food/Coughing  Has a lot of coughing while eating. Has had a couple events that she reports getting choked with foods. Reports her acid reflux is under control.  No weight loss.  Last Endoscopy 8/20/2013: moderate gastritis    Past Medical History:   Diagnosis Date    Acid reflux     Allergic rhinitis     Carotid stenosis, asymptomatic     Mild    Diabetes mellitus     Diverticulitis of colon     Diverticulosis     Family history of colon cancer     History of colon polyps     History of ear infections     History of streptococcal sore throat     Meniere's disease     Urinary tract infection        Past Surgical History:   Procedure Laterality Date    COLONOSCOPY N/A 08/31/2018    Diverticulosis in the left colon; The examination was otherwise normal on direct and retroflexion views; No specimens collected; Repeat 5 years    COLONOSCOPY  08/20/2013    Diverticulosis; Otherwise normal; Repeat 10 years    COLONOSCOPY  02/03/2004    Diverticulosis; One 1cm hyperplastic polyp in the transverse colon; Repeat 3 years    ENDOSCOPY  08/20/2013    LA grade A esophagitis; Antran erythema-biopsied    HYSTERECTOMY      INNER EAR SURGERY Left     Had ear surgery during early 20's    OOPHORECTOMY       TONSILLECTOMY          Current Outpatient Medications:     aspirin 81 MG EC tablet, Take 81 mg by mouth daily., Disp: , Rfl:     atorvastatin (LIPITOR) 10 MG tablet, Take 1 tablet by mouth Daily., Disp: , Rfl:     Calcium Carb-Cholecalciferol 600-20 MG-MCG tablet, Take 1 tablet by mouth Daily., Disp: , Rfl:     escitalopram (LEXAPRO) 10 MG tablet, Take 1 tablet by mouth Daily., Disp: , Rfl:     glipizide (GLUCOTROL XL) 5 MG ER tablet, Take 1 tablet by mouth Daily., Disp: , Rfl:     metFORMIN (GLUCOPHAGE) 500 MG tablet, Take 1 tablet by mouth 2 (Two) Times a Day., Disp: , Rfl: 1    multivitamin with minerals (Multivitamin Adults) tablet tablet, Take 1 tablet by mouth Daily., Disp: , Rfl:     True Metrix Blood Glucose Test test strip, , Disp: , Rfl:     No Known Allergies    Social History     Socioeconomic History    Marital status:    Tobacco Use    Smoking status: Never    Smokeless tobacco: Never   Vaping Use    Vaping Use: Never used   Substance and Sexual Activity    Alcohol use: Not Currently    Drug use: No    Sexual activity: Defer       Family History   Problem Relation Age of Onset    Cancer Mother     Breast cancer Mother     Stroke Father     Breast cancer Sister     Esophageal cancer Brother 72    No Known Problems Maternal Aunt     No Known Problems Paternal Aunt     No Known Problems Maternal Grandmother     No Known Problems Paternal Grandmother     No Known Problems Daughter     Rectal cancer Son 42    Colon cancer Son 42    BRCA 1/2 Neg Hx     Endometrial cancer Neg Hx     Ovarian cancer Neg Hx     Colon polyps Neg Hx     Liver cancer Neg Hx     Stomach cancer Neg Hx     Liver disease Neg Hx        Review of Systems   Constitutional:  Negative for unexpected weight change.   Respiratory:  Negative for shortness of breath.    Cardiovascular:  Negative for chest pain.     Objective     /60 (BP Location: Left arm, Patient Position: Sitting, Cuff Size: Adult)   Pulse 70   Temp 97.3 °F  "(36.3 °C) (Infrared)   Ht 160 cm (63\")   Wt 65.3 kg (144 lb)   LMP  (LMP Unknown)   SpO2 97%   Breastfeeding No   BMI 25.51 kg/m²     Physical Exam  Vitals reviewed.   Constitutional:       Appearance: Normal appearance.   Cardiovascular:      Rate and Rhythm: Normal rate and regular rhythm.      Heart sounds: Normal heart sounds.   Pulmonary:      Effort: Pulmonary effort is normal.      Breath sounds: Normal breath sounds.   Neurological:      Mental Status: She is alert.       Lab Results - Last 18 Months   Lab Units 07/14/23  1013 03/31/23  1008 10/24/22  0849 07/19/22  1108 03/28/22  1036   GLUCOSE mg/dL 206* 208* 178* 177* 168*   BUN mg/dL 20 20 15 16 18   CREATININE mg/dL 0.61 0.65 0.62 0.79 0.72   SODIUM mmol/L 140 139 140 141 143   POTASSIUM mmol/L 4.5 4.2 4.6 4.4 4.4   CHLORIDE mmol/L 104 101 104 103 102   CO2 mmol/L 25.0 28.0 26.5 26.9 27.4   TOTAL PROTEIN g/dL 6.5 6.9 6.3 7.2 6.7   ALBUMIN g/dL 4.3 4.4 4.10 4.20 4.00   ALT (SGPT) U/L 16 13 15 14 13   AST (SGOT) U/L 16 18 19 17 15   ALK PHOS U/L 111 99 115 94 99   BILIRUBIN mg/dL 0.4 0.5 0.3 0.4 0.4   GLOBULIN gm/dL 2.2 2.5 2.2 3.0 2.7       Lab Results - Last 18 Months   Lab Units 10/24/22  0849   HEMOGLOBIN g/dL 13.0   HEMATOCRIT % 39.0   MCV fL 93.5   WBC 10*3/mm3 6.13   RDW % 11.8*   MPV fL 10.4   PLATELETS 10*3/mm3 240       Lab Results - Last 18 Months   Lab Units 10/24/22  0849   TSH uIU/mL 1.850          IMPRESSION/PLAN:    Assessment & Plan      Problem List Items Addressed This Visit          Family History    Family history of colon cancer    Overview     Son had colorectal cancer at age 42         Relevant Orders    Case Request (Completed)       Gastrointestinal Abdominal     Hx of colonic polyps - Primary    Overview     History of hyperplastic colon polyp.  Last colonoscopy 8/31/2018 with diverticulosis of the left colon otherwise examination unremarkable.         Relevant Orders    Case Request (Completed)    Case Request (Completed) "    Dysphagia    Overview     Choking with foods on occassional basis, upper esophagus         Relevant Orders    Case Request (Completed)       Pulmonary and Pneumonias    Coughing     Endoscopy and Colonoscopy examination per Dr. Efrain Hsieh Prep          ..The risks, benefits, and alternatives of colonoscopy were reviewed with the patient today.  Risks including perforation of the colon possibly requiring surgery or colostomy.  Additional risks include risk of bleeding from biopsies or removal of colon tissue.  There is also the risk of a drug reaction or problems with anesthesia.  This will be discussed with the further by the anesthesia team on the day of the procedure.  Lastly there is a possibility of missing a colon polyp or cancer.  The benefits include the diagnosis and management of disease of the colon and rectum.  Alternatives to colonoscopy include barium enema, laboratory testing, radiographic evaluation, or no intervention.  The patient verbalizes understanding and agrees.    In accordance with requirements under the Affordable Care Act, [facility] has provided pricing for all hospital services and items on each of its websites. However, a patient's actual cost may differ based on the services the patient receives to meet individual healthcare needs and based on the benefits provided under the patient’s insurance coverage.        Alyson Mari, APRN  09/25/23  15:12 CDT    Part of this note may be an electronic transcription/translation of spoken language to printed text.

## 2023-09-26 ENCOUNTER — HOSPITAL ENCOUNTER (OUTPATIENT)
Dept: ULTRASOUND IMAGING | Facility: HOSPITAL | Age: 73
Discharge: HOME | End: 2023-09-26
Admitting: NURSE PRACTITIONER
Payer: MEDICARE

## 2023-09-26 ENCOUNTER — OFFICE VISIT (OUTPATIENT)
Dept: VASCULAR SURGERY | Facility: CLINIC | Age: 73
End: 2023-09-26
Payer: MEDICARE

## 2023-09-26 VITALS
HEART RATE: 76 BPM | DIASTOLIC BLOOD PRESSURE: 66 MMHG | HEIGHT: 63 IN | BODY MASS INDEX: 26.05 KG/M2 | WEIGHT: 147 LBS | SYSTOLIC BLOOD PRESSURE: 124 MMHG | OXYGEN SATURATION: 94 %

## 2023-09-26 DIAGNOSIS — I87.2 VENOUS (PERIPHERAL) INSUFFICIENCY: ICD-10-CM

## 2023-09-26 DIAGNOSIS — I65.23 ASYMPTOMATIC BILATERAL CAROTID ARTERY STENOSIS: Primary | ICD-10-CM

## 2023-09-26 DIAGNOSIS — E11.9 TYPE 2 DIABETES MELLITUS WITHOUT COMPLICATION, WITHOUT LONG-TERM CURRENT USE OF INSULIN: ICD-10-CM

## 2023-09-26 DIAGNOSIS — I65.23 BILATERAL CAROTID ARTERY STENOSIS: ICD-10-CM

## 2023-09-26 PROCEDURE — 93880 EXTRACRANIAL BILAT STUDY: CPT

## 2023-09-26 NOTE — LETTER
"September 26, 2023     Komal Laws DO  [address]  [address]  [place] [address]    Patient: Kaelyn Conte   YOB: 1950   Date of Visit: 9/26/2023     Dear Komal Laws DO:       Thank you for referring Kaelyn Conte to me for evaluation. Below are the relevant portions of my assessment and plan of care.    If you have questions, please do not hesitate to call me. I look forward to following Kaelyn along with you.         Sincerely,        Silvio Macias DO        CC: No Recipients    Silvio Macias DO  09/26/23 1720  Sign when Signing Visit  9/26/2023       Komal Laws DO  [address]  [facility] [id number]        Kaelyn Conte  1950    Chief Complaint   Patient presents with   • Follow-up     Studies this am here,no stroke symptoms , complains of warm running sensation to left leg       Dear Komal Laws DO:    HPI     I had the pleasure of seeing you patient in the office today for follow up.  As you recall, the patient is a 73 y.o. female who we are following for asymptomatic carotid occlusive disease.  Currently, she is doing well and denies any strokelike symptoms.  She denies any claudication to her lower extremities.  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  She is maintained on aspirin and Lipitor.  She feels like something is \"running \" down her left leg. She did have noninvasive testing performed today, which I did review in office.     Review of Systems   Constitutional: Negative.    HENT: Negative.     Eyes: Negative.    Respiratory: Negative.     Cardiovascular: Negative.    Gastrointestinal: Negative.    Endocrine: Negative.    Genitourinary: Negative.    Musculoskeletal: Negative.    Skin: Negative.    Allergic/Immunologic: Negative.    Neurological: Negative.    Hematological: Negative.    Psychiatric/Behavioral: Negative.        /66   Pulse 76   Ht 160 cm (63\")   Wt 66.7 kg (147 lb) "   LMP  (LMP Unknown)   SpO2 94%   BMI 26.04 kg/m²   Physical Exam  Vitals and nursing note reviewed.   Constitutional:       General: She is not in acute distress.     Appearance: Normal appearance. She is well-developed. She is not diaphoretic.   HENT:      Head: Normocephalic and atraumatic.   Eyes:      General: No scleral icterus.     Pupils: Pupils are equal, round, and reactive to light.   Neck:      Thyroid: No thyromegaly.      Vascular: No carotid bruit or JVD.   Cardiovascular:      Rate and Rhythm: Normal rate and regular rhythm.      Pulses: Normal pulses.      Heart sounds: Normal heart sounds and S2 normal. No murmur heard.    No friction rub. No gallop.      Comments: Spider varicosities to BLE  Pulmonary:      Effort: Pulmonary effort is normal.      Breath sounds: Normal breath sounds.   Abdominal:      General: Bowel sounds are normal.      Palpations: Abdomen is soft.   Musculoskeletal:         General: Normal range of motion.      Cervical back: Normal range of motion and neck supple.   Skin:     General: Skin is warm and dry.   Neurological:      General: No focal deficit present.      Mental Status: She is alert and oriented to person, place, and time.      Cranial Nerves: No cranial nerve deficit.   Psychiatric:         Mood and Affect: Mood normal.         Behavior: Behavior normal.         Thought Content: Thought content normal.         Judgment: Judgment normal.         DIAGNOSTIC DATA:  US Carotid Bilateral    Result Date: 9/26/2023  Narrative: History: Carotid occlusive disease      Impression: Impression: 1. There is 50 to 69% stenosis of the right internal carotid artery. 2. There is less than 50% stenosis of the left internal carotid artery. 3. Antegrade flow is demonstrated in bilateral vertebral arteries.  Comments: Bilateral carotid vertebral arterial duplex scan was performed.  Grayscale imaging shows intimal thickening and calcified elements at the carotid bifurcation. The  right internal carotid artery peak systolic velocity is 47.5 cm/sec. The end-diastolic velocity is 40.8 cm/sec. The right ICA/CCA ratio is approximately 1.4. These findings correlate with 50 to 69% stenosis of the right internal carotid artery.  Grayscale imaging shows intimal thickening and calcified elements at the carotid bifurcation. The left internal carotid artery peak systolic velocity is 87.4 cm/sec. The end-diastolic velocity is 24.2 cm/sec. The left ICA/CCA ratio is approximately 0.9. These findings correlate with less than 50% stenosis of the left internal carotid artery.  Antegrade flow is demonstrated in bilateral vertebral arteries.   This report was signed and finalized on 9/26/2023 4:07 PM CDT by Dr. Silvio Macias MD.      DEXA Bone Density Axial    Result Date: 9/11/2023  Narrative: EXAMINATION: DEXA BONE DENSITY AXIAL-  9/11/2023 9:58 AM CDT  HISTORY: Disorder of bone density and structure, unspecified; Z78.0-Asymptomatic menopausal state  The bone mineral density lumbar spine and left hip is obtained using DEXA technique.  Comparison is made with the previous study dated 08/04/2021.  The lumbar spine: The total mean bone mineral density lumbar spine is 1.062 g/sq cm and a T score of -1.1. The WHO classification is osteopenia. There is increased fracture risk.  When compared with the previous study, the bone mineral density has improved.  Left hip: The total mean bone mineral density left hip is 0.867 g/sq cm and a T score of -1.1. Bone mineral density of the femoral neck is 0.754 g/sq cm and a T score of -2.0. The WHO classification is osteopenia. There is increased fracture risk.  When compared with the previous study, the bone mineral density has decreased.      Impression: 1. The osteopenia and increased fracture risk. 2. When compared with the previous study, there are mixed changes in bone mineral density. The bone density of the left hip has decreased. This report was finalized on  09/11/2023 10:43 by Dr. Antonio Bay MD.    Mammo Screening Digital Tomosynthesis Bilateral With CAD    Result Date: 9/11/2023  Narrative: MAMMO SCREENING DIGITAL TOMOSYNTHESIS BILATERAL W CAD- 9/11/2023 10:07 AM CDT  CLINICAL HISTORY: Breast cancer screening.  ADDITIONAL HISTORY: Family history breast cancer in mother and sister. Kecia lifetime risk assessment 15.26%.   COMPARISON STUDIES: 07/26/2022, 07/21/2021, 07/14/2020.  TECHNIQUE: 2-D and 3-D digital mammography of bilateral breasts was obtained. CAD was utilized for assistance in detection.  BREAST COMPOSITION: Category B -- There are scattered areas of fibroglandular density.  FINDINGS: No architectural distortion, suspicious masses, or microcalcifications are identified. Intramammary lymph nodes are stable within the axillary tail of each breast. Nodular focus within the inferior slightly medial right breast near 5:00 mid depth is also unchanged. No skin changes are noted.  IMPRESSION AND RECOMMENDATION: No mammographic evidence of malignancy. Recommendation is for the patient to return for routine mammography in one year or sooner if clinically indicated.  BIRADS Category 2 - Benign findings    The patient will receive a letter notifying her of the results of this exam.  COMMENTS: 1. A negative x-ray report should not delay biopsy if a dominant or clinically suspicious mass is present. Four to eight percent of cancers are not identified by x-ray. 2. A negative report reinforces clinical impression. 3. Adenosis and dense breasts may obscure an underlying neoplasm. 4. False positive reports average 8% nationally. 5. The mammograms were evaluated using computer-aided detection.  This report was finalized on 09/11/2023 11:05 by Dr. Komal Gonzales MD.          Patient Active Problem List   Diagnosis   • Diabetes mellitus   • Carotid stenosis, asymptomatic   • Venous (peripheral) insufficiency   • Hx of colonic polyps   • Family history of colon cancer   •  Coughing   • Dysphagia         ICD-10-CM ICD-9-CM   1. Asymptomatic bilateral carotid artery stenosis  I65.23 433.10     433.30   2. Venous (peripheral) insufficiency  I87.2 459.81   3. Type 2 diabetes mellitus without complication, without long-term current use of insulin  E11.9 250.00         PLAN: After thoroughly evaluating Kaelyn Conte, I believe the best course of action is to remain conservative from vascular surgery standpoint.  Currently she is doing well denies any strokelike symptoms.  She also denies any claudication to her lower extremities.  We will see her back in 1 year with repeat noninvasive testing for continued surveillance, including a carotid duplex.  She can continue with compression stockings to aid with her swelling.  I did discuss vascular risk factors as they pertain to the progression of vascular disease including controlling her type 2 diabetes mellitus.   Her most recent hemoglobin A1c is uncontrolled at 8.  The patient is to continue taking their medications as previously discussed.   This was all discussed in full with complete understanding.    Thank you for allowing me to participate in the care of your patient.  Please do not hesitate to call with any questions or concerns.  We will keep you aware of any further encounters with Kaelyn Conte.      Sincerely Yours,      Silvio Macias, DO

## 2023-09-26 NOTE — PROGRESS NOTES
"9/26/2023       Komal Laws,   [address]  [place] [address]        Kaelyn Conte  1950    Chief Complaint   Patient presents with    Follow-up     Studies this am here,no stroke symptoms , complains of warm running sensation to left leg       Dear Komal Laws DO:    HPI     I had the pleasure of seeing you patient in the office today for follow up.  As you recall, the patient is a 73 y.o. female who we are following for asymptomatic carotid occlusive disease.  Currently, she is doing well and denies any strokelike symptoms.  She denies any claudication to her lower extremities.  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  She is maintained on aspirin and Lipitor.  She feels like something is \"running \" down her left leg. She did have noninvasive testing performed today, which I did review in office.     Review of Systems   Constitutional: Negative.    HENT: Negative.     Eyes: Negative.    Respiratory: Negative.     Cardiovascular: Negative.    Gastrointestinal: Negative.    Endocrine: Negative.    Genitourinary: Negative.    Musculoskeletal: Negative.    Skin: Negative.    Allergic/Immunologic: Negative.    Neurological: Negative.    Hematological: Negative.    Psychiatric/Behavioral: Negative.        /66   Pulse 76   Ht 160 cm (63\")   Wt 66.7 kg (147 lb)   LMP  (LMP Unknown)   SpO2 94%   BMI 26.04 kg/m²   Physical Exam  Vitals and nursing note reviewed.   Constitutional:       General: She is not in acute distress.     Appearance: Normal appearance. She is well-developed. She is not diaphoretic.   HENT:      Head: Normocephalic and atraumatic.   Eyes:      General: No scleral icterus.     Pupils: Pupils are equal, round, and reactive to light.   Neck:      Thyroid: No thyromegaly.      Vascular: No carotid bruit or JVD.   Cardiovascular:      Rate and Rhythm: Normal rate and regular rhythm.      Pulses: Normal pulses.      Heart sounds: " Normal heart sounds and S2 normal. No murmur heard.    No friction rub. No gallop.      Comments: Spider varicosities to BLE  Pulmonary:      Effort: Pulmonary effort is normal.      Breath sounds: Normal breath sounds.   Abdominal:      General: Bowel sounds are normal.      Palpations: Abdomen is soft.   Musculoskeletal:         General: Normal range of motion.      Cervical back: Normal range of motion and neck supple.   Skin:     General: Skin is warm and dry.   Neurological:      General: No focal deficit present.      Mental Status: She is alert and oriented to person, place, and time.      Cranial Nerves: No cranial nerve deficit.   Psychiatric:         Mood and Affect: Mood normal.         Behavior: Behavior normal.         Thought Content: Thought content normal.         Judgment: Judgment normal.         DIAGNOSTIC DATA:  US Carotid Bilateral    Result Date: 9/26/2023  Narrative: History: Carotid occlusive disease      Impression: Impression: 1. There is 50 to 69% stenosis of the right internal carotid artery. 2. There is less than 50% stenosis of the left internal carotid artery. 3. Antegrade flow is demonstrated in bilateral vertebral arteries.  Comments: Bilateral carotid vertebral arterial duplex scan was performed.  Grayscale imaging shows intimal thickening and calcified elements at the carotid bifurcation. The right internal carotid artery peak systolic velocity is 47.5 cm/sec. The end-diastolic velocity is 40.8 cm/sec. The right ICA/CCA ratio is approximately 1.4. These findings correlate with 50 to 69% stenosis of the right internal carotid artery.  Grayscale imaging shows intimal thickening and calcified elements at the carotid bifurcation. The left internal carotid artery peak systolic velocity is 87.4 cm/sec. The end-diastolic velocity is 24.2 cm/sec. The left ICA/CCA ratio is approximately 0.9. These findings correlate with less than 50% stenosis of the left internal carotid artery.  Antegrade  flow is demonstrated in bilateral vertebral arteries.   This report was signed and finalized on 9/26/2023 4:07 PM CDT by Dr. Silvio Macias MD.      DEXA Bone Density Axial    Result Date: 9/11/2023  Narrative: EXAMINATION: DEXA BONE DENSITY AXIAL-  9/11/2023 9:58 AM CDT  HISTORY: Disorder of bone density and structure, unspecified; Z78.0-Asymptomatic menopausal state  The bone mineral density lumbar spine and left hip is obtained using DEXA technique.  Comparison is made with the previous study dated 08/04/2021.  The lumbar spine: The total mean bone mineral density lumbar spine is 1.062 g/sq cm and a T score of -1.1. The WHO classification is osteopenia. There is increased fracture risk.  When compared with the previous study, the bone mineral density has improved.  Left hip: The total mean bone mineral density left hip is 0.867 g/sq cm and a T score of -1.1. Bone mineral density of the femoral neck is 0.754 g/sq cm and a T score of -2.0. The WHO classification is osteopenia. There is increased fracture risk.  When compared with the previous study, the bone mineral density has decreased.      Impression: 1. The osteopenia and increased fracture risk. 2. When compared with the previous study, there are mixed changes in bone mineral density. The bone density of the left hip has decreased. This report was finalized on 09/11/2023 10:43 by Dr. Antonio Bay MD.    Mammo Screening Digital Tomosynthesis Bilateral With CAD    Result Date: 9/11/2023  Narrative: MAMMO SCREENING DIGITAL TOMOSYNTHESIS BILATERAL W CAD- 9/11/2023 10:07 AM CDT  CLINICAL HISTORY: Breast cancer screening.  ADDITIONAL HISTORY: Family history breast cancer in mother and sister. Kecia lifetime risk assessment 15.26%.   COMPARISON STUDIES: 07/26/2022, 07/21/2021, 07/14/2020.  TECHNIQUE: 2-D and 3-D digital mammography of bilateral breasts was obtained. CAD was utilized for assistance in detection.  BREAST COMPOSITION: Category B -- There are  scattered areas of fibroglandular density.  FINDINGS: No architectural distortion, suspicious masses, or microcalcifications are identified. Intramammary lymph nodes are stable within the axillary tail of each breast. Nodular focus within the inferior slightly medial right breast near 5:00 mid depth is also unchanged. No skin changes are noted.  IMPRESSION AND RECOMMENDATION: No mammographic evidence of malignancy. Recommendation is for the patient to return for routine mammography in one year or sooner if clinically indicated.  BIRADS Category 2 - Benign findings    The patient will receive a letter notifying her of the results of this exam.  COMMENTS: 1. A negative x-ray report should not delay biopsy if a dominant or clinically suspicious mass is present. Four to eight percent of cancers are not identified by x-ray. 2. A negative report reinforces clinical impression. 3. Adenosis and dense breasts may obscure an underlying neoplasm. 4. False positive reports average 8% nationally. 5. The mammograms were evaluated using computer-aided detection.  This report was finalized on 09/11/2023 11:05 by Dr. Komal Gonzales MD.          Patient Active Problem List   Diagnosis    Diabetes mellitus    Carotid stenosis, asymptomatic    Venous (peripheral) insufficiency    Hx of colonic polyps    Family history of colon cancer    Coughing    Dysphagia         ICD-10-CM ICD-9-CM   1. Asymptomatic bilateral carotid artery stenosis  I65.23 433.10     433.30   2. Venous (peripheral) insufficiency  I87.2 459.81   3. Type 2 diabetes mellitus without complication, without long-term current use of insulin  E11.9 250.00         PLAN: After thoroughly evaluating Kaelyn Conte, I believe the best course of action is to remain conservative from vascular surgery standpoint.  Currently she is doing well denies any strokelike symptoms.  She also denies any claudication to her lower extremities.  We will see her back in 1 year with repeat  noninvasive testing for continued surveillance, including a carotid duplex.  She can continue with compression stockings to aid with her swelling.  I did discuss vascular risk factors as they pertain to the progression of vascular disease including controlling her type 2 diabetes mellitus.   Her most recent hemoglobin A1c is uncontrolled at 8.  The patient is to continue taking their medications as previously discussed.   This was all discussed in full with complete understanding.    Thank you for allowing me to participate in the care of your patient.  Please do not hesitate to call with any questions or concerns.  We will keep you aware of any further encounters with Kaelyn Conte.      Sincerely Yours,      Silvio Macias, DO

## 2023-10-25 ENCOUNTER — ANESTHESIA EVENT (OUTPATIENT)
Dept: GASTROENTEROLOGY | Facility: HOSPITAL | Age: 73
End: 2023-10-25
Payer: MEDICARE

## 2023-10-25 ENCOUNTER — ANESTHESIA (OUTPATIENT)
Dept: GASTROENTEROLOGY | Facility: HOSPITAL | Age: 73
End: 2023-10-25
Payer: MEDICARE

## 2023-10-25 ENCOUNTER — HOSPITAL ENCOUNTER (OUTPATIENT)
Facility: HOSPITAL | Age: 73
Setting detail: HOSPITAL OUTPATIENT SURGERY
Discharge: HOME | End: 2023-10-25
Attending: INTERNAL MEDICINE | Admitting: INTERNAL MEDICINE
Payer: MEDICARE

## 2023-10-25 VITALS
OXYGEN SATURATION: 98 % | WEIGHT: 142 LBS | HEIGHT: 63 IN | TEMPERATURE: 96.6 F | DIASTOLIC BLOOD PRESSURE: 61 MMHG | BODY MASS INDEX: 25.16 KG/M2 | SYSTOLIC BLOOD PRESSURE: 104 MMHG | HEART RATE: 61 BPM | RESPIRATION RATE: 12 BRPM

## 2023-10-25 DIAGNOSIS — R13.19 ESOPHAGEAL DYSPHAGIA: ICD-10-CM

## 2023-10-25 DIAGNOSIS — Z86.010 HX OF COLONIC POLYPS: ICD-10-CM

## 2023-10-25 PROBLEM — K21.00 GASTROESOPHAGEAL REFLUX DISEASE WITH ESOPHAGITIS: Status: ACTIVE | Noted: 2023-10-25

## 2023-10-25 LAB — GLUCOSE BLDC GLUCOMTR-MCNC: 171 MG/DL (ref 70–130)

## 2023-10-25 PROCEDURE — 82948 REAGENT STRIP/BLOOD GLUCOSE: CPT

## 2023-10-25 PROCEDURE — 25010000002 PROPOFOL 10 MG/ML EMULSION: Performed by: NURSE ANESTHETIST, CERTIFIED REGISTERED

## 2023-10-25 PROCEDURE — 88305 TISSUE EXAM BY PATHOLOGIST: CPT | Performed by: INTERNAL MEDICINE

## 2023-10-25 PROCEDURE — 43235 EGD DIAGNOSTIC BRUSH WASH: CPT | Performed by: INTERNAL MEDICINE

## 2023-10-25 PROCEDURE — 45385 COLONOSCOPY W/LESION REMOVAL: CPT | Performed by: INTERNAL MEDICINE

## 2023-10-25 PROCEDURE — 25810000003 SODIUM CHLORIDE 0.9 % SOLUTION: Performed by: ANESTHESIOLOGY

## 2023-10-25 RX ORDER — LIDOCAINE HYDROCHLORIDE 10 MG/ML
0.5 INJECTION, SOLUTION EPIDURAL; INFILTRATION; INTRACAUDAL; PERINEURAL ONCE AS NEEDED
Status: CANCELLED | OUTPATIENT
Start: 2023-10-25

## 2023-10-25 RX ORDER — PROPOFOL 10 MG/ML
VIAL (ML) INTRAVENOUS AS NEEDED
Status: DISCONTINUED | OUTPATIENT
Start: 2023-10-25 | End: 2023-10-25 | Stop reason: SURG

## 2023-10-25 RX ORDER — LIDOCAINE HYDROCHLORIDE 20 MG/ML
INJECTION, SOLUTION EPIDURAL; INFILTRATION; INTRACAUDAL; PERINEURAL AS NEEDED
Status: DISCONTINUED | OUTPATIENT
Start: 2023-10-25 | End: 2023-10-25 | Stop reason: SURG

## 2023-10-25 RX ORDER — SODIUM CHLORIDE 9 MG/ML
100 INJECTION, SOLUTION INTRAVENOUS CONTINUOUS
Status: CANCELLED | OUTPATIENT
Start: 2023-10-25

## 2023-10-25 RX ORDER — SODIUM CHLORIDE 9 MG/ML
40 INJECTION, SOLUTION INTRAVENOUS AS NEEDED
Status: CANCELLED | OUTPATIENT
Start: 2023-10-25

## 2023-10-25 RX ORDER — SODIUM CHLORIDE 9 MG/ML
500 INJECTION, SOLUTION INTRAVENOUS CONTINUOUS PRN
Status: DISCONTINUED | OUTPATIENT
Start: 2023-10-25 | End: 2023-10-25 | Stop reason: HOSPADM

## 2023-10-25 RX ORDER — PANTOPRAZOLE SODIUM 40 MG/1
40 TABLET, DELAYED RELEASE ORAL DAILY
Qty: 30 TABLET | Refills: 11 | Status: SHIPPED | OUTPATIENT
Start: 2023-10-25 | End: 2023-10-30

## 2023-10-25 RX ORDER — SODIUM CHLORIDE 0.9 % (FLUSH) 0.9 %
10 SYRINGE (ML) INJECTION AS NEEDED
Status: DISCONTINUED | OUTPATIENT
Start: 2023-10-25 | End: 2023-10-25 | Stop reason: HOSPADM

## 2023-10-25 RX ORDER — SODIUM CHLORIDE 0.9 % (FLUSH) 0.9 %
10 SYRINGE (ML) INJECTION EVERY 12 HOURS SCHEDULED
Status: CANCELLED | OUTPATIENT
Start: 2023-10-25

## 2023-10-25 RX ORDER — SODIUM CHLORIDE 0.9 % (FLUSH) 0.9 %
10 SYRINGE (ML) INJECTION AS NEEDED
Status: CANCELLED | OUTPATIENT
Start: 2023-10-25

## 2023-10-25 RX ADMIN — PROPOFOL INJECTABLE EMULSION 200 MG: 10 INJECTION, EMULSION INTRAVENOUS at 09:57

## 2023-10-25 RX ADMIN — PROPOFOL INJECTABLE EMULSION 200 MG: 10 INJECTION, EMULSION INTRAVENOUS at 10:16

## 2023-10-25 RX ADMIN — LIDOCAINE HYDROCHLORIDE 200 MG: 20 INJECTION, SOLUTION EPIDURAL; INFILTRATION; INTRACAUDAL; PERINEURAL at 09:57

## 2023-10-25 RX ADMIN — PROPOFOL INJECTABLE EMULSION 200 MG: 10 INJECTION, EMULSION INTRAVENOUS at 10:04

## 2023-10-25 RX ADMIN — SODIUM CHLORIDE 500 ML: 9 INJECTION, SOLUTION INTRAVENOUS at 09:16

## 2023-10-25 NOTE — ANESTHESIA POSTPROCEDURE EVALUATION
"Patient: Kaelyn Conte    Procedure Summary       Date: 10/25/23 Room / Location: [facility] ENDOSCOPY 6 /  [facility]    Anesthesia Start: 0955 Anesthesia Stop: 1028    Procedures:       ESOPHAGOGASTRODUODENOSCOPY WITH ANESTHESIA      COLONOSCOPY WITH ANESTHESIA Diagnosis:       Hx of colonic polyps      Esophageal dysphagia      (Hx of colonic polyps [Z86.010])      (Esophageal dysphagia [R13.19])    Surgeons: Denise Zuniga MD Provider: Timothy Soto CRNA    Anesthesia Type: MAC ASA Status: 2            Anesthesia Type: MAC    Vitals  Vitals Value Taken Time   /56 10/25/23 1028   Temp     Pulse 60 10/25/23 1028   Resp     SpO2 96 % 10/25/23 1028   Vitals shown include unfiled device data.        Post Anesthesia Care and Evaluation    Patient location during evaluation: PHASE II  Patient participation: complete - patient participated  Level of consciousness: awake and alert  Pain management: adequate    Airway patency: patent  Anesthetic complications: No anesthetic complications    Cardiovascular status: acceptable  Respiratory status: acceptable  Hydration status: acceptable    Comments: Blood pressure 137/57, pulse 69, temperature 96.6 °F (35.9 °C), temperature source Temporal, resp. rate 20, height 160 cm (63\"), weight 64.4 kg (142 lb), SpO2 97%, not currently breastfeeding.    Pt discharged from PACU based on addy score >8    "

## 2023-10-25 NOTE — ANESTHESIA PREPROCEDURE EVALUATION
Anesthesia Evaluation     Patient summary reviewed   NPO Solid Status: > 8 hours  NPO Liquid Status: > 4 hours           Airway   Mallampati: II  TM distance: <3 FB  Neck ROM: full  Dental - normal exam         Pulmonary - normal exam    breath sounds clear to auscultation  (+) ,sleep apnea on CPAP  (-) asthma, recent URI, not a smoker  Cardiovascular - normal exam  Exercise tolerance: good (4-7 METS)    Rhythm: regular  Rate: normal    (+)  carotid artery disease  (-) pacemaker, hypertension, past MI, angina, cardiac stents, CABG      Neuro/Psych  (+) dizziness/light headedness  (-) seizures, TIA, CVA  GI/Hepatic/Renal/Endo    (+) GERD well controlled, diabetes mellitus  (-) liver disease, no renal disease    Musculoskeletal     Abdominal    Substance History      OB/GYN          Other                          Anesthesia Plan    ASA 2     MAC     intravenous induction     Anesthetic plan, risks, benefits, and alternatives have been provided, discussed and informed consent has been obtained with: patient.

## 2023-10-26 PROBLEM — Z86.0101 HISTORY OF ADENOMATOUS POLYP OF COLON: Status: ACTIVE | Noted: 2018-07-31

## 2023-10-26 LAB
CYTO UR: NORMAL
LAB AP CASE REPORT: NORMAL
Lab: NORMAL
PATH REPORT.FINAL DX SPEC: NORMAL
PATH REPORT.GROSS SPEC: NORMAL

## 2023-10-30 ENCOUNTER — TELEPHONE (OUTPATIENT)
Dept: GASTROENTEROLOGY | Facility: CLINIC | Age: 73
End: 2023-10-30
Payer: MEDICARE

## 2023-10-30 RX ORDER — FAMOTIDINE 40 MG/1
40 TABLET, FILM COATED ORAL DAILY
Qty: 30 TABLET | Refills: 11 | Status: SHIPPED | OUTPATIENT
Start: 2023-10-30

## 2023-10-30 NOTE — TELEPHONE ENCOUNTER
Called and spoke to pt re: Dr. Zuniga's recommendations-she VU. Pt advised to call me back with any further questions/problems or with any issues at pharmacy.

## 2023-10-30 NOTE — TELEPHONE ENCOUNTER
"Pt called me this morning and wanted to discuss results and Pantoprazole-states on VM that she was \"out of it\" after her procedures and doesn't remember everything she was told. I called her back just now and spoke to her-advised her we sent her a letter at the end of last week that probably hadn't arrived yet, but I read her the letter.     Dr. Zuniga-pt tells me you sent in a script for Pantoprazole. She tells me she's heard bad things about what it can do to your bones. She tells me she has osteopenia and was a -2 in her hip at last DEXA scan. She was asking if there was something else she could take instead? I told her I would ask you and call her back later today.     "

## 2023-10-30 NOTE — TELEPHONE ENCOUNTER
Prescription changed from pantoprazole to Pepcid 40 mg daily.  The purpose of either one of these medications was to heal the esophagitis that was seen on her endoscopy.    Denise Zuniga MD

## 2024-01-29 ENCOUNTER — LAB (OUTPATIENT)
Dept: LAB | Facility: HOSPITAL | Age: 74
End: 2024-01-29
Payer: MEDICARE

## 2024-01-29 ENCOUNTER — TRANSCRIBE ORDERS (OUTPATIENT)
Dept: ADMINISTRATIVE | Facility: HOSPITAL | Age: 74
End: 2024-01-29
Payer: MEDICARE

## 2024-01-29 DIAGNOSIS — E11.9 DIABETES MELLITUS WITHOUT COMPLICATION: ICD-10-CM

## 2024-01-29 DIAGNOSIS — I10 ESSENTIAL HYPERTENSION, MALIGNANT: ICD-10-CM

## 2024-01-29 DIAGNOSIS — E11.9 DIABETES MELLITUS WITHOUT COMPLICATION: Primary | ICD-10-CM

## 2024-01-29 LAB
ALBUMIN SERPL-MCNC: 4.4 G/DL (ref 3.5–5.2)
ALBUMIN/GLOB SERPL: 2.1 G/DL
ALP SERPL-CCNC: 96 U/L (ref 39–117)
ALT SERPL W P-5'-P-CCNC: 18 U/L (ref 1–33)
ANION GAP SERPL CALCULATED.3IONS-SCNC: 9 MMOL/L (ref 5–15)
AST SERPL-CCNC: 22 U/L (ref 1–32)
BASOPHILS # BLD AUTO: 0.04 10*3/MM3 (ref 0–0.2)
BASOPHILS NFR BLD AUTO: 0.6 % (ref 0–1.5)
BILIRUB SERPL-MCNC: 0.5 MG/DL (ref 0–1.2)
BUN SERPL-MCNC: 16 MG/DL (ref 8–23)
BUN/CREAT SERPL: 20 (ref 7–25)
CALCIUM SPEC-SCNC: 9.5 MG/DL (ref 8.6–10.5)
CHLORIDE SERPL-SCNC: 104 MMOL/L (ref 98–107)
CHOLEST SERPL-MCNC: 113 MG/DL (ref 0–200)
CO2 SERPL-SCNC: 29 MMOL/L (ref 22–29)
CREAT SERPL-MCNC: 0.8 MG/DL (ref 0.57–1)
DEPRECATED RDW RBC AUTO: 40.7 FL (ref 37–54)
EGFRCR SERPLBLD CKD-EPI 2021: 77.9 ML/MIN/1.73
EOSINOPHIL # BLD AUTO: 0.08 10*3/MM3 (ref 0–0.4)
EOSINOPHIL NFR BLD AUTO: 1.2 % (ref 0.3–6.2)
ERYTHROCYTE [DISTWIDTH] IN BLOOD BY AUTOMATED COUNT: 11.8 % (ref 12.3–15.4)
GLOBULIN UR ELPH-MCNC: 2.1 GM/DL
GLUCOSE SERPL-MCNC: 180 MG/DL (ref 65–99)
HBA1C MFR BLD: 7.8 % (ref 4.8–5.6)
HCT VFR BLD AUTO: 40.1 % (ref 34–46.6)
HDLC SERPL-MCNC: 50 MG/DL (ref 40–60)
HGB BLD-MCNC: 13.7 G/DL (ref 12–15.9)
IMM GRANULOCYTES # BLD AUTO: 0.01 10*3/MM3 (ref 0–0.05)
IMM GRANULOCYTES NFR BLD AUTO: 0.1 % (ref 0–0.5)
LDLC SERPL CALC-MCNC: 44 MG/DL (ref 0–100)
LDLC/HDLC SERPL: 0.86 {RATIO}
LYMPHOCYTES # BLD AUTO: 1.74 10*3/MM3 (ref 0.7–3.1)
LYMPHOCYTES NFR BLD AUTO: 26 % (ref 19.6–45.3)
MCH RBC QN AUTO: 31.9 PG (ref 26.6–33)
MCHC RBC AUTO-ENTMCNC: 34.2 G/DL (ref 31.5–35.7)
MCV RBC AUTO: 93.5 FL (ref 79–97)
MONOCYTES # BLD AUTO: 0.49 10*3/MM3 (ref 0.1–0.9)
MONOCYTES NFR BLD AUTO: 7.3 % (ref 5–12)
NEUTROPHILS NFR BLD AUTO: 4.33 10*3/MM3 (ref 1.7–7)
NEUTROPHILS NFR BLD AUTO: 64.8 % (ref 42.7–76)
NRBC BLD AUTO-RTO: 0 /100 WBC (ref 0–0.2)
PLATELET # BLD AUTO: 244 10*3/MM3 (ref 140–450)
PMV BLD AUTO: 10.3 FL (ref 6–12)
POTASSIUM SERPL-SCNC: 4.1 MMOL/L (ref 3.5–5.2)
PROT SERPL-MCNC: 6.5 G/DL (ref 6–8.5)
RBC # BLD AUTO: 4.29 10*6/MM3 (ref 3.77–5.28)
SODIUM SERPL-SCNC: 142 MMOL/L (ref 136–145)
T4 FREE SERPL-MCNC: 1.06 NG/DL (ref 0.93–1.7)
TRIGL SERPL-MCNC: 101 MG/DL (ref 0–150)
TSH SERPL DL<=0.05 MIU/L-ACNC: 1.54 UIU/ML (ref 0.27–4.2)
URATE SERPL-MCNC: 5.1 MG/DL (ref 2.4–5.7)
VLDLC SERPL-MCNC: 19 MG/DL (ref 5–40)
WBC NRBC COR # BLD AUTO: 6.69 10*3/MM3 (ref 3.4–10.8)

## 2024-01-29 PROCEDURE — 80053 COMPREHEN METABOLIC PANEL: CPT

## 2024-01-29 PROCEDURE — 36415 COLL VENOUS BLD VENIPUNCTURE: CPT

## 2024-01-29 PROCEDURE — 84550 ASSAY OF BLOOD/URIC ACID: CPT

## 2024-01-29 PROCEDURE — 85025 COMPLETE CBC W/AUTO DIFF WBC: CPT

## 2024-01-29 PROCEDURE — 84443 ASSAY THYROID STIM HORMONE: CPT

## 2024-01-29 PROCEDURE — 83036 HEMOGLOBIN GLYCOSYLATED A1C: CPT

## 2024-01-29 PROCEDURE — 84439 ASSAY OF FREE THYROXINE: CPT

## 2024-01-29 PROCEDURE — 80061 LIPID PANEL: CPT

## 2024-05-27 ENCOUNTER — TRANSCRIBE ORDERS (OUTPATIENT)
Dept: ADMINISTRATIVE | Facility: HOSPITAL | Age: 74
End: 2024-05-27
Payer: MEDICARE

## 2024-05-27 ENCOUNTER — LAB (OUTPATIENT)
Dept: LAB | Facility: HOSPITAL | Age: 74
End: 2024-05-27
Payer: MEDICARE

## 2024-05-27 DIAGNOSIS — I10 ESSENTIAL (PRIMARY) HYPERTENSION: ICD-10-CM

## 2024-05-27 DIAGNOSIS — E11.9 TYPE 2 DIABETES MELLITUS WITHOUT COMPLICATION, UNSPECIFIED WHETHER LONG TERM INSULIN USE: ICD-10-CM

## 2024-05-27 DIAGNOSIS — E11.9 TYPE 2 DIABETES MELLITUS WITHOUT COMPLICATION, UNSPECIFIED WHETHER LONG TERM INSULIN USE: Primary | ICD-10-CM

## 2024-05-27 LAB
ALBUMIN SERPL-MCNC: 4.6 G/DL (ref 3.5–5.2)
ALBUMIN/GLOB SERPL: 1.6 G/DL
ALP SERPL-CCNC: 105 U/L (ref 39–117)
ALT SERPL W P-5'-P-CCNC: 16 U/L (ref 1–33)
ANION GAP SERPL CALCULATED.3IONS-SCNC: 12 MMOL/L (ref 5–15)
AST SERPL-CCNC: 19 U/L (ref 1–32)
BILIRUB SERPL-MCNC: 0.5 MG/DL (ref 0–1.2)
BUN SERPL-MCNC: 23 MG/DL (ref 8–23)
BUN/CREAT SERPL: 32.4 (ref 7–25)
CALCIUM SPEC-SCNC: 9.6 MG/DL (ref 8.6–10.5)
CHLORIDE SERPL-SCNC: 105 MMOL/L (ref 98–107)
CHOLEST SERPL-MCNC: 109 MG/DL (ref 0–200)
CO2 SERPL-SCNC: 24 MMOL/L (ref 22–29)
CREAT SERPL-MCNC: 0.71 MG/DL (ref 0.57–1)
EGFRCR SERPLBLD CKD-EPI 2021: 89.4 ML/MIN/1.73
GLOBULIN UR ELPH-MCNC: 2.8 GM/DL
GLUCOSE SERPL-MCNC: 178 MG/DL (ref 65–99)
HBA1C MFR BLD: 7.9 % (ref 4.8–5.6)
HDLC SERPL-MCNC: 51 MG/DL (ref 40–60)
LDLC SERPL CALC-MCNC: 42 MG/DL (ref 0–100)
LDLC/HDLC SERPL: 0.84 {RATIO}
POTASSIUM SERPL-SCNC: 4.4 MMOL/L (ref 3.5–5.2)
PROT SERPL-MCNC: 7.4 G/DL (ref 6–8.5)
SODIUM SERPL-SCNC: 141 MMOL/L (ref 136–145)
TRIGL SERPL-MCNC: 76 MG/DL (ref 0–150)
VLDLC SERPL-MCNC: 16 MG/DL (ref 5–40)

## 2024-05-27 PROCEDURE — 83036 HEMOGLOBIN GLYCOSYLATED A1C: CPT

## 2024-05-27 PROCEDURE — 80061 LIPID PANEL: CPT

## 2024-05-27 PROCEDURE — 80053 COMPREHEN METABOLIC PANEL: CPT

## 2024-09-17 ENCOUNTER — TELEPHONE (OUTPATIENT)
Dept: VASCULAR SURGERY | Facility: CLINIC | Age: 74
End: 2024-09-17
Payer: MEDICARE

## 2024-09-18 ENCOUNTER — HOSPITAL ENCOUNTER (OUTPATIENT)
Dept: ULTRASOUND IMAGING | Facility: HOSPITAL | Age: 74
Discharge: HOME | End: 2024-09-18
Admitting: SURGERY
Payer: MEDICARE

## 2024-09-18 ENCOUNTER — OFFICE VISIT (OUTPATIENT)
Dept: VASCULAR SURGERY | Facility: CLINIC | Age: 74
End: 2024-09-18
Payer: MEDICARE

## 2024-09-18 VITALS
DIASTOLIC BLOOD PRESSURE: 76 MMHG | SYSTOLIC BLOOD PRESSURE: 128 MMHG | HEIGHT: 63 IN | BODY MASS INDEX: 25.52 KG/M2 | HEART RATE: 86 BPM | OXYGEN SATURATION: 96 % | WEIGHT: 144 LBS

## 2024-09-18 DIAGNOSIS — I73.9 PAD (PERIPHERAL ARTERY DISEASE): ICD-10-CM

## 2024-09-18 DIAGNOSIS — I65.23 ASYMPTOMATIC BILATERAL CAROTID ARTERY STENOSIS: Primary | ICD-10-CM

## 2024-09-18 DIAGNOSIS — I87.2 VENOUS (PERIPHERAL) INSUFFICIENCY: ICD-10-CM

## 2024-09-18 DIAGNOSIS — E11.9 TYPE 2 DIABETES MELLITUS WITHOUT COMPLICATION, WITHOUT LONG-TERM CURRENT USE OF INSULIN: ICD-10-CM

## 2024-09-18 DIAGNOSIS — I65.23 ASYMPTOMATIC BILATERAL CAROTID ARTERY STENOSIS: ICD-10-CM

## 2024-09-18 PROCEDURE — 93880 EXTRACRANIAL BILAT STUDY: CPT

## 2024-09-18 RX ORDER — CHOLECALCIFEROL (VITAMIN D3) 25 MCG
1000 TABLET ORAL DAILY
COMMUNITY

## 2024-10-14 ENCOUNTER — HOSPITAL ENCOUNTER (OUTPATIENT)
Dept: PHYSICAL THERAPY | Age: 74
Setting detail: THERAPIES SERIES
Discharge: HOME | End: 2024-10-14
Payer: MEDICARE

## 2024-10-14 PROCEDURE — 97162 PT EVAL MOD COMPLEX 30 MIN: CPT

## 2024-10-14 ASSESSMENT — PAIN DESCRIPTION - PAIN TYPE: TYPE: CHRONIC PAIN

## 2024-10-14 ASSESSMENT — PAIN DESCRIPTION - DESCRIPTORS: DESCRIPTORS: DULL;TIGHTNESS

## 2024-10-14 ASSESSMENT — PAIN DESCRIPTION - ORIENTATION: ORIENTATION: LEFT;RIGHT

## 2024-10-14 ASSESSMENT — PAIN DESCRIPTION - LOCATION: LOCATION: KNEE

## 2024-10-14 NOTE — PROGRESS NOTES
Physical Therapy: Initial Evaluation    Patient: Princess Lorenzo (74 y.o. female)   Examination Date: 10/14/2024  Plan of Care Certification Period: 10/14/2024 to        :  1950 ;    MRN: [id number]  CSN: [id number]   Insurance: Payor: HU[employer]NA MEDICARE / Plan: HUMANA CHOICE-PPO MEDICARE / Product Type: *No Product type* /   Insurance ID: [id number] - (Medicare Managed) Secondary Insurance (if applicable):    Referring Physician: Watson Matthew MD Thane Deweese, MD   PCP: Julio C Perera MD Visits to Date/Visits Approved: 1 /      No Show/Cancelled Appts:   /       Medical Diagnosis: Unilateral primary osteoarthritis, left knee [M17.12]  Unilateral primary osteoarthritis, right knee [M17.11] Bilateral knee OA  Treatment Diagnosis: Bilateral knee OA     PERTINENT MEDICAL HISTORY   Patient Assessed for Rehabilitation Services: Yes       Medical History: Chart Reviewed: Yes   Past Medical History:   Diagnosis Date    Cancer (HCC)     lung and breast    Carotid artery occlusion     Hypertension     Osteoarthritis     Osteoporosis     Type II or unspecified type diabetes mellitus without mention of complication, not stated as uncontrolled      Surgical History:   Past Surgical History:   Procedure Laterality Date    HYSTERECTOMY (CERVIX STATUS UNKNOWN)      POLYPECTOMY      TONSILLECTOMY         Medications:   Current Outpatient Medications:     triamterene-hydrochlorothiazide (MAXZIDE-25) 37.5-25 MG per tablet, Take 1 tablet by mouth daily., Disp: , Rfl:     Calcium Carbonate-Vitamin D (CALCIUM PLUS VITAMIN D PO), Take  by mouth daily., Disp: , Rfl:     Naproxen Sodium (ALEVE PO), Take  by mouth as needed., Disp: , Rfl:     Multiple Vitamins-Minerals (CENTRUM SILVER PO), Take  by mouth., Disp: , Rfl:     aspirin 81 MG EC tablet, Take 81 mg by mouth daily., Disp: , Rfl:   Allergies: Patient has no known allergies.      SUBJECTIVE EXAMINATION     History obtained from:: Chart Review, Patient,

## 2024-10-15 ENCOUNTER — HOSPITAL ENCOUNTER (OUTPATIENT)
Dept: PHYSICAL THERAPY | Age: 74
Setting detail: THERAPIES SERIES
Discharge: HOME | End: 2024-10-15
Payer: MEDICARE

## 2024-10-15 PROCEDURE — 97110 THERAPEUTIC EXERCISES: CPT

## 2024-10-15 ASSESSMENT — PAIN DESCRIPTION - ORIENTATION: ORIENTATION: RIGHT;LEFT

## 2024-10-15 ASSESSMENT — PAIN DESCRIPTION - LOCATION: LOCATION: KNEE

## 2024-10-15 ASSESSMENT — PAIN DESCRIPTION - DESCRIPTORS: DESCRIPTORS: ACHING

## 2024-10-15 ASSESSMENT — PAIN SCALES - GENERAL: PAINLEVEL_OUTOF10: 2

## 2024-10-15 ASSESSMENT — PAIN DESCRIPTION - PAIN TYPE: TYPE: CHRONIC PAIN

## 2024-10-15 NOTE — PROGRESS NOTES
Physical Therapy: Daily Note   Patient: Princess Lorenzo (74 y.o. female)   Examination Date: 10/15/2024  Plan of Care/Certification Expiration Date: 24    No data recorded   :  1950 # of Visits since SOC:   2   MRN: [id number]  CSN: [id number] Start of Care Date:   10/14/2024   Insurance: Payor: HUMANA MEDICARE / Plan: HUMANA CHOICE-PPO MEDICARE / Product Type: *No Product type* /   Insurance ID: [id number] - (Medicare Managed) Secondary Insurance (if applicable):    Referring Physician: Watson Matthew MD Thane Deweese, MD   PCP: Julio C Perera MD Visits to Date/Visits Approved:     No Show/Cancelled Appts:   /       Medical Diagnosis: Unilateral primary osteoarthritis, left knee [M17.12]  Unilateral primary osteoarthritis, right knee [M17.11] Bilateral knee OA  Treatment Diagnosis: Bilateral knee OA        SUBJECTIVE EXAMINATION   Pain Level: Pain Screening  Patient Currently in Pain: Yes  Pain Assessment: 0-10  Pain Level: 2  Pain Type: Chronic pain  Pain Location: Knee  Pain Orientation: Right, Left  Pain Descriptors: Aching    Patient Comments: Subjective: She rates knee pain at 2/10.  No pain increase after initial PT eval.    HEP Compliance: Good  Previous treatments prior to current episode?: Injections     OBJECTIVE EXAMINATION   Restrictions:  No data recorded No data recorded No data recorded              TREATMENT     Exercises:      Treatment Reasoning    Exercise 1: LBE; 5 min, Level 1; Dist: .43  Exercise 2: Quad sets/VMO quad sets; 15 reps  Exercise 3: Heel slides AROM, 25 reps  Exercise 4: Knee extension stretch on towel; 60 sec each  Exercise 5: SAQ, 25 reps  Exercise 6: SLR (neutral, ER), 15 reps  Exercise 7: Sidelying hip abd, 15 each  Exercise 8: LAQ/HS Curl, 15 reps each  Exercise 9: Sitting hip abd/ball squeeze, 15 reps  Exercise 10: Sitting march, 25 reps each                          Pt Education:         ASSESSMENT     Assessment: Assessment: Ms. Lorenzo performs

## 2024-10-16 ENCOUNTER — APPOINTMENT (OUTPATIENT)
Dept: PHYSICAL THERAPY | Age: 74
End: 2024-10-16
Payer: MEDICARE

## 2024-10-16 ENCOUNTER — LAB (OUTPATIENT)
Dept: LAB | Facility: HOSPITAL | Age: 74
End: 2024-10-16
Payer: MEDICARE

## 2024-10-16 ENCOUNTER — TRANSCRIBE ORDERS (OUTPATIENT)
Dept: ADMINISTRATIVE | Facility: HOSPITAL | Age: 74
End: 2024-10-16
Payer: MEDICARE

## 2024-10-16 DIAGNOSIS — I10 ESSENTIAL (PRIMARY) HYPERTENSION: ICD-10-CM

## 2024-10-16 DIAGNOSIS — E11.9 TYPE 2 DIABETES MELLITUS WITHOUT COMPLICATION, UNSPECIFIED WHETHER LONG TERM INSULIN USE: Primary | ICD-10-CM

## 2024-10-16 DIAGNOSIS — E11.9 TYPE 2 DIABETES MELLITUS WITHOUT COMPLICATION, UNSPECIFIED WHETHER LONG TERM INSULIN USE: ICD-10-CM

## 2024-10-16 LAB
ALBUMIN SERPL-MCNC: 4.1 G/DL (ref 3.5–5.2)
ALBUMIN/GLOB SERPL: 1.6 G/DL
ALP SERPL-CCNC: 120 U/L (ref 39–117)
ALT SERPL W P-5'-P-CCNC: 13 U/L (ref 1–33)
ANION GAP SERPL CALCULATED.3IONS-SCNC: 8 MMOL/L (ref 5–15)
AST SERPL-CCNC: 16 U/L (ref 1–32)
BILIRUB SERPL-MCNC: 0.3 MG/DL (ref 0–1.2)
BUN SERPL-MCNC: 19 MG/DL (ref 8–23)
BUN/CREAT SERPL: 31.7 (ref 7–25)
CALCIUM SPEC-SCNC: 9 MG/DL (ref 8.6–10.5)
CHLORIDE SERPL-SCNC: 105 MMOL/L (ref 98–107)
CO2 SERPL-SCNC: 27 MMOL/L (ref 22–29)
CREAT SERPL-MCNC: 0.6 MG/DL (ref 0.57–1)
EGFRCR SERPLBLD CKD-EPI 2021: 94.3 ML/MIN/1.73
GLOBULIN UR ELPH-MCNC: 2.5 GM/DL
GLUCOSE SERPL-MCNC: 193 MG/DL (ref 65–99)
HBA1C MFR BLD: 8.8 % (ref 4.8–5.6)
POTASSIUM SERPL-SCNC: 4.5 MMOL/L (ref 3.5–5.2)
PROT SERPL-MCNC: 6.6 G/DL (ref 6–8.5)
SODIUM SERPL-SCNC: 140 MMOL/L (ref 136–145)

## 2024-10-16 PROCEDURE — 36415 COLL VENOUS BLD VENIPUNCTURE: CPT

## 2024-10-16 PROCEDURE — 83036 HEMOGLOBIN GLYCOSYLATED A1C: CPT

## 2024-10-16 PROCEDURE — 80053 COMPREHEN METABOLIC PANEL: CPT

## 2024-10-17 ENCOUNTER — TELEPHONE (OUTPATIENT)
Dept: GASTROENTEROLOGY | Facility: CLINIC | Age: 74
End: 2024-10-17
Payer: MEDICARE

## 2024-10-17 NOTE — TELEPHONE ENCOUNTER
Pt called me just asking about her Famotidine. She wanted me to ask you if thought she should continue taking it. She has been on it since her endo last year. She tells me she has no issues with reflux or trouble swallowing. She would like to try to discontinue it to see how she does-if she were to start having issues she would call us back to get a new prescription.     Dr. Zuniga-I told her I would send you a message to see if you thought that was reasonable or if she needs to continue taking it? She knows I will call her back tomorrow with your recommendations.

## 2024-10-18 NOTE — TELEPHONE ENCOUNTER
Called and spoke to pt re: Dr. Zuniga's recommendations-she VU. She tells me she is going to stop the Pepcid to see how she does. She will call me back if she starts having issues so that we can re-start her Pepcid at that time.

## 2024-10-18 NOTE — TELEPHONE ENCOUNTER
Please remind her that she did have esophagitis at the time of endoscopy.  That was the purpose for the Pepcid.  Having said that, if she is doing well and follows lifestyle modifications, I have no problems with her stopping the Pepcid and seeing how she does.    Advise to refrain from chocolate, alcohol, smoking, peppermint and caffeine.  Also advise to limit fatty foods, large meals, and eating late at nighttime.  Advise to reach an ideal body mass index.    Please call us back if any problems and we will be happy to renew her prescription.    Denise Zuniga MD

## 2024-10-23 ENCOUNTER — HOSPITAL ENCOUNTER (OUTPATIENT)
Dept: PHYSICAL THERAPY | Age: 74
Setting detail: THERAPIES SERIES
Discharge: HOME | End: 2024-10-23
Payer: MEDICARE

## 2024-10-23 PROCEDURE — 97110 THERAPEUTIC EXERCISES: CPT

## 2024-10-23 NOTE — PROGRESS NOTES
abd/ext--15  Exercise 14: Standing march--1 minute  Exercise 15: Heel/toe raise--20 reps  Exercise 16: Step ups fwd/lateral/retro--10/10/10--not today  Exercise 17: TKE  Exercise 18: Partial alternating lunges as tolerated  Exercise 19: Box steps  Exercise 20: Sidestepping on line                          Pt Education:         ASSESSMENT     Assessment: Assessment: Ms. Lorenzo appeared to perform well at her session today, able to accept some new exercises from her program and also some new challenges to the ones performed previously. Continue with current routine, add remaining activites as her tolerance and time permits.  Body Structures, Functions, Activity Limitations Requiring Skilled Therapeutic Intervention: Decreased functional mobility , Decreased ROM, Decreased strength, Decreased high-level IADLs    Post-Treatment Pain Level:      Activity Tolerance: Patient tolerated evaluation without incident    Therapy Prognosis: Good       GOALS   Patient Goals : improve strength and mobility  Short Term Goals Completed by 4-6 weeks Current Status Goal Status   Independent with HEP   New   Improve bilat LE strength to 5/5   New   Improve knee AROM to 0 degrees extension bilat (supine)   New   Improve 5x sit to stand to 16 sec or better   New                                                       Long Term Goals Completed by 4-6 weeks Current Status Goal Status   Report improved ability to stand up from lower surfaces   New   Up/down 3 steps with reciprocal pattern   New   Improve LEFS score to at least 55/80   New                                                                TREATMENT PLAN   Plan Frequency: 2x  Plan weeks: 4-6 weeks  Current Treatment Recommendations: Strengthening, ROM, Balance training, Stair training, Manual, Positioning, Pain management, Equipment evaluation, education, & procurement, Patient/Caregiver education & training, Safety education & training, Home exercise program   Requires PT

## 2024-10-25 ENCOUNTER — HOSPITAL ENCOUNTER (OUTPATIENT)
Dept: PHYSICAL THERAPY | Age: 74
Setting detail: THERAPIES SERIES
Discharge: HOME | End: 2024-10-25
Payer: MEDICARE

## 2024-10-25 PROCEDURE — 97110 THERAPEUTIC EXERCISES: CPT

## 2024-10-25 NOTE — PROGRESS NOTES
Physical Therapy: Daily Note   Patient: Princess Lorenzo (74 y.o. female)   Examination Date: 10/25/2024  Plan of Care/Certification Expiration Date: 24    No data recorded   :  1950 # of Visits since SOC:   4   MRN: [id number]  CSN: [id number] Start of Care Date:   10/14/2024   Insurance: Payor: HUMANA MEDICARE / Plan: HUMANA CHOICE-PPO MEDICARE / Product Type: *No Product type* /   Insurance ID: [id number] - (Medicare Managed) Secondary Insurance (if applicable):    Referring Physician: Watson Matthew MD Thane Deweese, MD   PCP: Julio C Perera MD Visits to Date/Visits Approved:     No Show/Cancelled Appts:   /       Medical Diagnosis: Unilateral primary osteoarthritis, left knee [M17.12]  Unilateral primary osteoarthritis, right knee [M17.11] Bilateral knee OA  Treatment Diagnosis: Bilateral knee OA        SUBJECTIVE EXAMINATION   Pain Level: Pain Screening  Patient Currently in Pain: Denies    Patient Comments: Subjective: Patient states she exercised 50 minutes after her last PT session, some aching in her knees bilaterally today. No other changes since her last visit.    HEP Compliance:   Previous treatments prior to current episode?: Injections     OBJECTIVE EXAMINATION   Restrictions:  No data recorded No data recorded No data recorded      TREATMENT     Exercises:      Treatment Reasoning    Exercise 1: LBE; 5 min, Level 1; Dist: .43 (did arms also)  Exercise 2: Quad sets/VMO quad sets; 20 reps  Exercise 3: Heel slides AROM, 25 reps, 1.5# bilaterally  Exercise 4: Knee extension stretch on towel; 2 min  each  Exercise 5: SAQ, 25 reps, 1.5# bilaterally  Exercise 6: SLR (neutral, ER), 15 reps  Exercise 7: Sidelying hip abd, 15 each  Exercise 8: LAQ/HS Curl, 20 reps each bilaterally, green band  Exercise 9: Sitting hip abd/ball squeeze, 20 reps  Exercise 10: Sitting march, 25 reps each  Exercise 11: Repeated sit to stand--10 reps, from mat  Exercise 12: Mini squats--10 reps  Exercise

## 2024-10-28 ENCOUNTER — HOSPITAL ENCOUNTER (OUTPATIENT)
Dept: PHYSICAL THERAPY | Age: 74
Setting detail: THERAPIES SERIES
Discharge: HOME | End: 2024-10-28
Payer: MEDICARE

## 2024-10-28 PROCEDURE — 97110 THERAPEUTIC EXERCISES: CPT

## 2024-10-28 RX ORDER — FAMOTIDINE 40 MG/1
40 TABLET, FILM COATED ORAL DAILY
Qty: 90 TABLET | Refills: 3 | OUTPATIENT
Start: 2024-10-28

## 2024-10-28 ASSESSMENT — PAIN DESCRIPTION - LOCATION: LOCATION: KNEE

## 2024-10-28 ASSESSMENT — PAIN SCALES - GENERAL: PAINLEVEL_OUTOF10: 1

## 2024-10-28 ASSESSMENT — PAIN DESCRIPTION - PAIN TYPE: TYPE: CHRONIC PAIN

## 2024-10-28 ASSESSMENT — PAIN DESCRIPTION - DESCRIPTORS: DESCRIPTORS: ACHING;DISCOMFORT

## 2024-10-28 ASSESSMENT — PAIN DESCRIPTION - ORIENTATION: ORIENTATION: LEFT;RIGHT

## 2024-10-28 NOTE — PROGRESS NOTES
Physical Therapy  Daily Treatment Note  Date: 10/28/2024  Patient Name: Princess Lorenzo  MRN: [id number]     :   1950    Subjective:   General  Additional Pertinent Hx: 73 y/o F presents with dx of bilateral knee OA.  PMH includes breast ca and lung ca, osteoporosis, DM, OA, carotid artery occlusion  PT Visit Information  PT Insurance Information: [employer] (precert req'd)  Total # of Visits Approved: 11  Total # of Visits to Date: 5  Plan of Care/Certification Expiration Date: 24  Progress Note Due Date: 24  Referring Provider (secondary): Remy Matthew MD  Subjective: Feeling good this morning    Pain Screening  Patient Currently in Pain: Yes  Pain Assessment: 0-10  Pain Level: 1  Pain Type: Chronic pain  Pain Location: Knee  Pain Orientation: Left;Right  Pain Descriptors: Aching;Discomfort     Treatment Activities:    Exercises  Exercise 1: LBE; 6 min, Level 2; Dist: .58  Exercise 2: Quad sets/VMO quad sets; 20 reps  Exercise 3: Heel slides AROM, 25 reps, 2 pound weight bilaterally  Exercise 4: Knee extension stretch on towel; 2 min  each  Exercise 5: SAQ, 25 reps, 2 pound weight bilaterally  Exercise 6: SLR (neutral, ER), 15 reps each ext lag christy  Exercise 7: Sidelying hip abd, 15 each  Exercise 8: LAQ/HS Curl, 20 reps each bilaterally, green band  Exercise 9: Sitting hip abd/ball squeeze, 20 reps  Exercise 10: Sitting march, 25 reps each  Exercise 11: Repeated sit to stand--10 reps, from mat  Exercise 12: Mini squats--10 reps  Exercise 13: Standing hip abd/ext--15    Assessment:   Conditions Requiring Skilled Therapeutic Intervention  Body Structures, Functions, Activity Limitations Requiring Skilled Therapeutic Intervention: Decreased functional mobility ;Decreased ROM;Decreased strength;Decreased high-level IADLs  Assessment: Patient had difficultly with quad sets.  She was able to increase weight today and tolerated well.  May need to decrease time on bike or reps to complete all

## 2024-10-28 NOTE — TELEPHONE ENCOUNTER
See 9/2024 telephone encounter-pt has decided to stop this medication to see how she does. She will call if she needs refills to this refill not appropriate-denied refill request.

## 2024-10-31 ENCOUNTER — APPOINTMENT (OUTPATIENT)
Dept: PHYSICAL THERAPY | Age: 74
End: 2024-10-31
Payer: MEDICARE

## 2024-11-01 ENCOUNTER — HOSPITAL ENCOUNTER (OUTPATIENT)
Dept: PHYSICAL THERAPY | Age: 74
Setting detail: THERAPIES SERIES
Discharge: HOME | End: 2024-11-01
Payer: MEDICARE

## 2024-11-01 PROCEDURE — 97110 THERAPEUTIC EXERCISES: CPT

## 2024-11-01 NOTE — PROGRESS NOTES
Physical Therapy: Daily Note   Patient: Princess Lorenzo (74 y.o. female)   Examination Date: 2024  Plan of Care/Certification Expiration Date: 24    No data recorded   :  1950 # of Visits since SOC:   6   MRN: [id number]  CSN: [id number] Start of Care Date:   10/14/2024   Insurance: Payor: [employer] MEDICARE / Plan: [employer] CHOICE-PPO MEDICARE / Product Type: *No Product type* /   Insurance ID: [id number] - (Medicare Managed) Secondary Insurance (if applicable):    Referring Physician: Watson Matthew MD Thane Deweese, MD   PCP: Julio C Perera MD Visits to Date/Visits Approved:     No Show/Cancelled Appts:   /       Medical Diagnosis: Unilateral primary osteoarthritis, left knee [M17.12]  Unilateral primary osteoarthritis, right knee [M17.11] Bilateral knee OA  Treatment Diagnosis: Bilateral knee OA        SUBJECTIVE EXAMINATION   Pain Level: Pain Screening  Patient Currently in Pain: Denies  Pain Assessment: 0-10    Patient Comments: Subjective: Patient reports she had pain yesterday, had to  line for an hour to vote, left leg feels like it might give way and sometimes hyperextends the knee.    HEP Compliance:           OBJECTIVE EXAMINATION   Restrictions:  No data recorded No data recorded No data recorded        TREATMENT     Exercises:  Therapeutic exercise (CPT 69793)   Treatment Reasoning    Exercise 1: LBE; 7 min, Level 2; Dist: .70  Exercise 2: Quad sets/VMO quad sets; 20 reps  Exercise 3: Heel slides AROM, 25 reps, 2 pound weight bilaterally  Exercise 4: Knee extension stretch on towel; 2 min  each--not today  Exercise 5: SAQ, 25 reps, 2 pound weight bilaterally  Exercise 6: SLR (neutral, ER), 15 reps each ext lag christy  Exercise 7: Sidelying hip abd, 15 each  Exercise 8: bilateral LAQ/HS Curl, 25 reps each bilaterally, green band, 2# weight for LAQ's  Exercise 9: Sitting hip abd/ball squeeze, 25 reps  Exercise 10: Sitting march, 25 reps each  Exercise 11: Repeated sit to

## 2024-11-06 ENCOUNTER — HOSPITAL ENCOUNTER (OUTPATIENT)
Dept: PHYSICAL THERAPY | Age: 74
Setting detail: THERAPIES SERIES
Discharge: HOME | End: 2024-11-06
Payer: MEDICARE

## 2024-11-06 PROCEDURE — 97110 THERAPEUTIC EXERCISES: CPT

## 2024-11-06 NOTE — PROGRESS NOTES
Physical Therapy  Daily Treatment Note  Date: 2024  Patient Name: Princess Lorenzo  MRN: [id number]     :   1950    Subjective:      PT Visit Information  PT Insurance Information: [employer] (precert req'd)  Total # of Visits Approved: 11  Total # of Visits to Date: 7  Plan of Care/Certification Expiration Date: 24  Progress Note Due Date: 24  Referring Provider (secondary): Remy Matthew MD  Subjective: Patient reports that she is having no pain today and reports no new issues since previous session.    Pain Screening  Patient Currently in Pain: Denies       Treatment Activities:   Exercises  Exercise 1: LBE; 7 min, Level 2; Dist: .70  Exercise 2: Quad sets/VMO quad sets; 20 reps  Exercise 3: Heel slides AROM, 25 reps, 2 pound weight bilaterally  Exercise 4: Knee extension stretch on towel; 2 min  each  Exercise 5: SAQ, 25 reps, 2 pound weight bilaterally  Exercise 6: SLR (neutral, ER), 15 reps each ext lag christy  Exercise 7: Sidelying hip abd, 15 each  Exercise 8: bilateral LAQ/HS Curl, 25 reps each bilaterally, green band, 2# weight for LAQ's  Exercise 9: Sitting hip abd/ball squeeze, 25 reps  Exercise 10: Sitting march, 25 reps each with 2#  Exercise 11: Repeated sit to stand--15 reps, from mat  Exercise 12: Mini squats--15 reps  Exercise 13: Standing hip abd/ext--15  Exercise 14: heel/toe raise--25 reps  Exercise 15: left knee step ups/side steps left leg/retro--10/10/10  Exercise 16: Step ups fwd/lateral/retro--10/10/10--  Exercise 17: TKE  Exercise 18: Partial alternating lunges as tolerated  Exercise 19: Box steps  Exercise 20: Sidestepping on line     Assessment:   Conditions Requiring Skilled Therapeutic Intervention  Body Structures, Functions, Activity Limitations Requiring Skilled Therapeutic Intervention: Decreased functional mobility ;Decreased ROM;Decreased strength;Decreased high-level IADLs  Assessment: Patient completes exercises per flowsheet without reports of

## 2024-11-08 ENCOUNTER — HOSPITAL ENCOUNTER (OUTPATIENT)
Dept: PHYSICAL THERAPY | Age: 74
Setting detail: THERAPIES SERIES
Discharge: HOME | End: 2024-11-08
Payer: MEDICARE

## 2024-11-08 PROCEDURE — 97110 THERAPEUTIC EXERCISES: CPT

## 2024-11-08 ASSESSMENT — PAIN SCALES - GENERAL: PAINLEVEL_OUTOF10: 0

## 2024-11-08 NOTE — PROGRESS NOTES
left leg/retro--10/10/10  Exercise 16: Step ups fwd/lateral/retro--10/10/10--  Exercise 17: TKE- red band x 15  Exercise 18: Partial alternating lunges as tolerated- 10 each  Exercise 19: Box steps x 5--- not today  Exercise 20: Sidestepping // bars 3 down/back-------------HEP given 11/8    Limitations addressed: Mobility, Strength, Flexibility, Activity tolerance  Therapist provided: Verbal cuing  Progressed: Complexity of movement                      Assessment:   Conditions Requiring Skilled Therapeutic Intervention  Body Structures, Functions, Activity Limitations Requiring Skilled Therapeutic Intervention: Decreased functional mobility ;Decreased ROM;Decreased strength;Decreased high-level IADLs  Assessment: Pt did well with all exercises. Tired post session but no pain. Issued HEP to pt, has bands at home already.  Treatment Diagnosis: Bilateral knee OA      Goals:  Short Term Goals  Time Frame for Short Term Goals: 4-6 weeks  Short Term Goal 1: Independent with HEP  Short Term Goal 2: Improve bilat LE strength to 5/5  Short Term Goal 3: Improve knee AROM to 0 degrees extension bilat (supine)  Short Term Goal 4: Improve 5x sit to stand to 16 sec or better  Long Term Goals  Time Frame for Long Term Goals : 4-6 weeks  Long Term Goal 1: Report improved ability to stand up from lower surfaces  Long Term Goal 2: Up/down 3 steps with reciprocal pattern  Long Term Goal 3: Improve LEFS score to at least 55/80  Patient Goals   Patient Goals : improve strength and mobility    Plan:    Physical Therapy Plan  Plan weeks: 4-6 weeks  Current Treatment Recommendations: Strengthening, ROM, Balance training, Stair training, Manual, Positioning, Pain management, Equipment evaluation, education, & procurement, Patient/Caregiver education & training, Safety education & training, Home exercise program        Therapy Time:   Individual Concurrent Group Co-treatment   Time In 1000         Time Out 1055         Minutes 55

## 2024-11-11 ENCOUNTER — HOSPITAL ENCOUNTER (OUTPATIENT)
Dept: PHYSICAL THERAPY | Age: 74
Setting detail: THERAPIES SERIES
Discharge: HOME | End: 2024-11-11
Payer: MEDICARE

## 2024-11-11 PROCEDURE — 97110 THERAPEUTIC EXERCISES: CPT

## 2024-11-11 NOTE — PROGRESS NOTES
Physical Therapy  Daily Treatment Note  Date: 2024  Patient Name: Princess Lorenzo  MRN: [id number]     :   1950    Subjective:      PT Visit Information  PT Insurance Information: [employer] (precert req'd)  Total # of Visits Approved: 11  Total # of Visits to Date: 9  Plan of Care/Certification Expiration Date: 24  Progress Note Due Date: 24  Referring Provider (secondary): Remy Matthew MD  Subjective: Patient reports no pain this morning and denies any issues since previous session.    Pain Screening  Patient Currently in Pain: Denies       Treatment Activities:   Exercises  Exercise 1: LBE; 7 min, Level 2; Dist: 0.76  Exercise 2: Quad sets/VMO quad sets; 20 reps  Exercise 3: Heel slides AROM, 25 reps, 2 pound weight bilaterally  Exercise 4: Knee extension stretch on towel; 3 min each  Exercise 5: SAQ, 25 reps, 2 pound weight bilaterally  Exercise 6: SLR (neutral, ER), 15 reps each ext lag christy  Exercise 7: Sidelying hip abd, 15 each  Exercise 8: bilateral LAQ/HS Curl, 25 reps each bilaterally, green band, 2# weight for LAQ's  Exercise 9: Sitting hip abd/ball squeeze, 25 reps  Exercise 10: Sitting march, 25 reps each with 2#  Exercise 11: Repeated sit to stand--15 reps, from mat  Exercise 12: Mini squats--15 reps  Exercise 13: Standing hip abd/ext--15  Exercise 14: heel/toe raise--20 reps  Exercise 15: left knee step ups/side steps left leg/retro--15/15/15  Exercise 16: right knee Step ups fwd/lateral/retro--15/15/15  Exercise 17: TKE- green band x 15  Exercise 18: Partial alternating lunges as tolerated- 10 each  Exercise 19: Box steps x 5 CW/CCW  Exercise 20: Sidestepping // bars 3 down/back-------------HEP given      Assessment:   Conditions Requiring Skilled Therapeutic Intervention  Body Structures, Functions, Activity Limitations Requiring Skilled Therapeutic Intervention: Decreased functional mobility ;Decreased ROM;Decreased strength;Decreased high-level

## 2024-11-13 ENCOUNTER — HOSPITAL ENCOUNTER (OUTPATIENT)
Dept: PHYSICAL THERAPY | Age: 74
Setting detail: THERAPIES SERIES
Discharge: HOME | End: 2024-11-13
Payer: MEDICARE

## 2024-11-13 PROCEDURE — 97110 THERAPEUTIC EXERCISES: CPT

## 2024-11-13 NOTE — PROGRESS NOTES
TREATMENT PLAN   Current Treatment Recommendations: Strengthening, ROM, Balance training   Requires PT Follow-Up: No  Specific Instructions for Next Treatment: Discharge from formal PT services     Therapy Time  Individual Time In: 1008       Individual Time Out: 1055  Minutes: 47        Electronically signed by Umm Trivedi PT  on 11/13/2024 at 11:57 AM

## 2024-12-19 ENCOUNTER — TRANSCRIBE ORDERS (OUTPATIENT)
Dept: ADMINISTRATIVE | Facility: HOSPITAL | Age: 74
End: 2024-12-19
Payer: MEDICARE

## 2024-12-19 DIAGNOSIS — Z12.31 ENCOUNTER FOR SCREENING MAMMOGRAM FOR MALIGNANT NEOPLASM OF BREAST: Primary | ICD-10-CM

## 2024-12-27 ENCOUNTER — HOSPITAL ENCOUNTER (OUTPATIENT)
Dept: MAMMOGRAPHY | Facility: HOSPITAL | Age: 74
Discharge: HOME | End: 2024-12-27
Admitting: FAMILY MEDICINE
Payer: MEDICARE

## 2024-12-27 DIAGNOSIS — Z12.31 ENCOUNTER FOR SCREENING MAMMOGRAM FOR MALIGNANT NEOPLASM OF BREAST: ICD-10-CM

## 2024-12-27 LAB
NCCN CRITERIA FLAG: ABNORMAL
TYRER CUZICK SCORE: 5.6

## 2024-12-27 PROCEDURE — 77067 SCR MAMMO BI INCL CAD: CPT

## 2024-12-27 PROCEDURE — 77063 BREAST TOMOSYNTHESIS BI: CPT

## 2024-12-30 ENCOUNTER — DOCUMENTATION (OUTPATIENT)
Dept: GENETICS | Facility: HOSPITAL | Age: 74
End: 2024-12-30
Payer: MEDICARE

## 2024-12-30 NOTE — PROGRESS NOTES
I attempted to contact the patient to discuss risk assessment results and was unable to leave a voicemail.

## 2024-12-30 NOTE — PROGRESS NOTES
This patient recently completed the CARE risk assessment for a mammogram appointment. Based on the patient's responses, NCCN criteria for genetic testing was met.     Navigator follow-up:     I spoke with the patient regarding the risk assessment results and our genetic testing program.  She declined genetic testing at this time.

## 2025-01-07 DIAGNOSIS — K21.00 GASTROESOPHAGEAL REFLUX DISEASE WITH ESOPHAGITIS, UNSPECIFIED WHETHER HEMORRHAGE: Primary | ICD-10-CM

## 2025-01-07 NOTE — TELEPHONE ENCOUNTER
Rx Refill Note  Requested Prescriptions      No prescriptions requested or ordered in this encounter      Last office visit with prescribing clinician: 10/25/2023 for endo/colon    Last telemedicine visit with prescribing clinician: Visit date not found     Next office visit with prescribing clinician: Visit date not found     Pt called me just now c/o increased indigestion and states she wants to go back on her Famotidine. She had been on it previously and wanted to stop the medication-see 10/30/2023 telephone encounter. I advised her I would send a script to Dr. Zuniga, but since it's been over a year since she was seen she would need an OV with Sotero ZEE and is agreeable to that so I transferred her to [facility] to arrange. Pt aware that Dr. Zuniga is out of office today so script will go to pharmacy tomorrow.                           Would you like a call back once the refill request has been completed: [] Yes [] No    If the office needs to give you a call back, can they leave a voicemail: [] Yes [] No    Mylene Marsh MA  01/07/25, 13:49 CST

## 2025-01-08 RX ORDER — FAMOTIDINE 40 MG/1
40 TABLET, FILM COATED ORAL DAILY
Qty: 30 TABLET | Refills: 1 | Status: SHIPPED | OUTPATIENT
Start: 2025-01-08

## 2025-01-16 ENCOUNTER — OFFICE VISIT (OUTPATIENT)
Age: 75
End: 2025-01-16

## 2025-01-16 VITALS — WEIGHT: 141 LBS | BODY MASS INDEX: 24.98 KG/M2 | HEIGHT: 63 IN

## 2025-01-16 DIAGNOSIS — M17.11 PRIMARY LOCALIZED OSTEOARTHRITIS OF RIGHT KNEE: ICD-10-CM

## 2025-01-16 DIAGNOSIS — M17.12 PRIMARY LOCALIZED OSTEOARTHRITIS OF LEFT KNEE: Primary | ICD-10-CM

## 2025-01-16 RX ORDER — ATORVASTATIN CALCIUM 10 MG/1
TABLET, FILM COATED ORAL
COMMUNITY

## 2025-01-16 RX ORDER — BETAMETHASONE SODIUM PHOSPHATE AND BETAMETHASONE ACETATE 3; 3 MG/ML; MG/ML
6 INJECTION, SUSPENSION INTRA-ARTICULAR; INTRALESIONAL; INTRAMUSCULAR; SOFT TISSUE ONCE
Status: COMPLETED | OUTPATIENT
Start: 2025-01-16 | End: 2025-01-16

## 2025-01-16 RX ORDER — CHOLECALCIFEROL (VITAMIN D3) 25 MCG
1000 TABLET ORAL DAILY
COMMUNITY

## 2025-01-16 RX ORDER — ESCITALOPRAM OXALATE 10 MG/1
10 TABLET ORAL DAILY
COMMUNITY

## 2025-01-16 RX ORDER — FAMOTIDINE 40 MG/1
40 TABLET, FILM COATED ORAL DAILY
COMMUNITY
Start: 2025-01-08

## 2025-01-16 RX ORDER — GLIPIZIDE 10 MG/1
10 TABLET, FILM COATED, EXTENDED RELEASE ORAL DAILY
COMMUNITY
Start: 2024-11-23

## 2025-01-16 RX ORDER — LIDOCAINE HYDROCHLORIDE 10 MG/ML
6 INJECTION, SOLUTION INFILTRATION; PERINEURAL ONCE
Status: COMPLETED | OUTPATIENT
Start: 2025-01-16 | End: 2025-01-16

## 2025-01-16 RX ADMIN — BETAMETHASONE SODIUM PHOSPHATE AND BETAMETHASONE ACETATE 6 MG: 3; 3 INJECTION, SUSPENSION INTRA-ARTICULAR; INTRALESIONAL; INTRAMUSCULAR; SOFT TISSUE at 09:56

## 2025-01-16 RX ADMIN — LIDOCAINE HYDROCHLORIDE 6 ML: 10 INJECTION, SOLUTION INFILTRATION; PERINEURAL at 09:57

## 2025-01-16 ASSESSMENT — ENCOUNTER SYMPTOMS: SHORTNESS OF BREATH: 0

## 2025-01-16 NOTE — PROGRESS NOTES
[facility]  [facility]  [address] [place] [address]  Dept: [phone]  Dept Fax: [phone]  Loc: [phone]     Princess Lorenzo (:  1950) is a 74 y.o. female,Established patient, here for evaluation of the following:    Chief Complaint   Patient presents with    Knee Pain     [place] knee           Subjective   Patient is a 74-year-old  female came to the clinic to follow-up after bilateral knee injections.  Her last office visit was on 2024.  She also did physical therapy after that visit.  She states it helped her quite a bit.  She has joined a gym and is doing exercises daily.  She has a  that helps her as well.  Her right knee is doing well and has no complaints.  She states her left knee has periodic pain behind her knee.  She feels it when she is walking and sometimes she feels unsteady.        No Known Allergies  Past Surgical History:   Procedure Laterality Date    HYSTERECTOMY (CERVIX STATUS UNKNOWN)      POLYPECTOMY      TONSILLECTOMY       Social History     Tobacco Use    Smoking status: Never    Smokeless tobacco: Never   Substance Use Topics    Alcohol use: No          Review of Systems   Constitutional:  Negative for fatigue and fever.   Respiratory:  Negative for shortness of breath.    Cardiovascular:  Negative for chest pain.   Musculoskeletal:  Positive for arthralgias. Negative for joint swelling and myalgias.   Skin:  Negative for rash and wound.   Neurological:  Negative for weakness and numbness.   Psychiatric/Behavioral:  Negative for agitation and confusion.           Objective   Physical Exam  Constitutional:       General: She is not in acute distress.     Appearance: Normal appearance.   HENT:      Head: Normocephalic.   Pulmonary:      Effort: Pulmonary effort is normal.   Musculoskeletal:      Comments: Upon inspection of the left knee there is no erythema, ecchymosis, deformity.

## 2025-02-03 ENCOUNTER — TRANSCRIBE ORDERS (OUTPATIENT)
Dept: ADMINISTRATIVE | Facility: HOSPITAL | Age: 75
End: 2025-02-03
Payer: MEDICARE

## 2025-02-03 ENCOUNTER — LAB (OUTPATIENT)
Dept: LAB | Facility: HOSPITAL | Age: 75
End: 2025-02-03
Payer: MEDICARE

## 2025-02-03 DIAGNOSIS — I10 ESSENTIAL (PRIMARY) HYPERTENSION: ICD-10-CM

## 2025-02-03 DIAGNOSIS — E78.5 HYPERLIPIDEMIA, UNSPECIFIED HYPERLIPIDEMIA TYPE: ICD-10-CM

## 2025-02-03 DIAGNOSIS — E11.8 TYPE 2 DIABETES MELLITUS WITH UNSPECIFIED COMPLICATIONS: ICD-10-CM

## 2025-02-03 DIAGNOSIS — K21.9 GASTRO-ESOPHAGEAL REFLUX DISEASE WITHOUT ESOPHAGITIS: ICD-10-CM

## 2025-02-03 DIAGNOSIS — E11.8 TYPE 2 DIABETES MELLITUS WITH UNSPECIFIED COMPLICATIONS: Primary | ICD-10-CM

## 2025-02-03 LAB
ALBUMIN SERPL-MCNC: 4.3 G/DL (ref 3.5–5.2)
ALBUMIN UR-MCNC: <1.2 MG/DL
ALBUMIN/GLOB SERPL: 1.7 G/DL
ALP SERPL-CCNC: 147 U/L (ref 39–117)
ALT SERPL W P-5'-P-CCNC: 21 U/L (ref 1–33)
ANION GAP SERPL CALCULATED.3IONS-SCNC: 9 MMOL/L (ref 5–15)
AST SERPL-CCNC: 21 U/L (ref 1–32)
BASOPHILS # BLD AUTO: 0.04 10*3/MM3 (ref 0–0.2)
BASOPHILS NFR BLD AUTO: 0.5 % (ref 0–1.5)
BILIRUB SERPL-MCNC: 0.2 MG/DL (ref 0–1.2)
BUN SERPL-MCNC: 20 MG/DL (ref 8–23)
BUN/CREAT SERPL: 29.4 (ref 7–25)
CALCIUM SPEC-SCNC: 9.6 MG/DL (ref 8.6–10.5)
CHLORIDE SERPL-SCNC: 105 MMOL/L (ref 98–107)
CHOLEST SERPL-MCNC: 118 MG/DL (ref 0–200)
CO2 SERPL-SCNC: 26 MMOL/L (ref 22–29)
CREAT SERPL-MCNC: 0.68 MG/DL (ref 0.57–1)
CREAT UR-MCNC: 54.4 MG/DL
DEPRECATED RDW RBC AUTO: 40 FL (ref 37–54)
EGFRCR SERPLBLD CKD-EPI 2021: 91.5 ML/MIN/1.73
EOSINOPHIL # BLD AUTO: 0.08 10*3/MM3 (ref 0–0.4)
EOSINOPHIL NFR BLD AUTO: 1.1 % (ref 0.3–6.2)
ERYTHROCYTE [DISTWIDTH] IN BLOOD BY AUTOMATED COUNT: 11.9 % (ref 12.3–15.4)
GLOBULIN UR ELPH-MCNC: 2.6 GM/DL
GLUCOSE SERPL-MCNC: 232 MG/DL (ref 65–99)
HBA1C MFR BLD: 9.1 % (ref 4.8–5.6)
HCT VFR BLD AUTO: 40.2 % (ref 34–46.6)
HDLC SERPL-MCNC: 56 MG/DL (ref 40–60)
HGB BLD-MCNC: 13.6 G/DL (ref 12–15.9)
IMM GRANULOCYTES # BLD AUTO: 0.03 10*3/MM3 (ref 0–0.05)
IMM GRANULOCYTES NFR BLD AUTO: 0.4 % (ref 0–0.5)
LDLC SERPL CALC-MCNC: 44 MG/DL (ref 0–100)
LDLC/HDLC SERPL: 0.77 {RATIO}
LYMPHOCYTES # BLD AUTO: 1.6 10*3/MM3 (ref 0.7–3.1)
LYMPHOCYTES NFR BLD AUTO: 21.7 % (ref 19.6–45.3)
MCH RBC QN AUTO: 31 PG (ref 26.6–33)
MCHC RBC AUTO-ENTMCNC: 33.8 G/DL (ref 31.5–35.7)
MCV RBC AUTO: 91.6 FL (ref 79–97)
MICROALBUMIN/CREAT UR: NORMAL MG/G{CREAT}
MONOCYTES # BLD AUTO: 0.48 10*3/MM3 (ref 0.1–0.9)
MONOCYTES NFR BLD AUTO: 6.5 % (ref 5–12)
NEUTROPHILS NFR BLD AUTO: 5.14 10*3/MM3 (ref 1.7–7)
NEUTROPHILS NFR BLD AUTO: 69.8 % (ref 42.7–76)
NRBC BLD AUTO-RTO: 0 /100 WBC (ref 0–0.2)
PLATELET # BLD AUTO: 241 10*3/MM3 (ref 140–450)
PMV BLD AUTO: 9.8 FL (ref 6–12)
POTASSIUM SERPL-SCNC: 4.6 MMOL/L (ref 3.5–5.2)
PROT SERPL-MCNC: 6.9 G/DL (ref 6–8.5)
RBC # BLD AUTO: 4.39 10*6/MM3 (ref 3.77–5.28)
SODIUM SERPL-SCNC: 140 MMOL/L (ref 136–145)
T4 FREE SERPL-MCNC: 1.06 NG/DL (ref 0.93–1.7)
TRIGL SERPL-MCNC: 94 MG/DL (ref 0–150)
TSH SERPL DL<=0.05 MIU/L-ACNC: 1.95 UIU/ML (ref 0.27–4.2)
URATE SERPL-MCNC: 4.4 MG/DL (ref 2.4–5.7)
VLDLC SERPL-MCNC: 18 MG/DL (ref 5–40)
WBC NRBC COR # BLD AUTO: 7.37 10*3/MM3 (ref 3.4–10.8)

## 2025-02-03 PROCEDURE — 83036 HEMOGLOBIN GLYCOSYLATED A1C: CPT

## 2025-02-03 PROCEDURE — 84550 ASSAY OF BLOOD/URIC ACID: CPT

## 2025-02-03 PROCEDURE — 36415 COLL VENOUS BLD VENIPUNCTURE: CPT

## 2025-02-03 PROCEDURE — 85025 COMPLETE CBC W/AUTO DIFF WBC: CPT

## 2025-02-03 PROCEDURE — 80061 LIPID PANEL: CPT

## 2025-02-03 PROCEDURE — 84443 ASSAY THYROID STIM HORMONE: CPT

## 2025-02-03 PROCEDURE — 82570 ASSAY OF URINE CREATININE: CPT

## 2025-02-03 PROCEDURE — 84439 ASSAY OF FREE THYROXINE: CPT

## 2025-02-03 PROCEDURE — 82043 UR ALBUMIN QUANTITATIVE: CPT

## 2025-02-03 PROCEDURE — 80053 COMPREHEN METABOLIC PANEL: CPT

## 2025-02-13 ENCOUNTER — OFFICE VISIT (OUTPATIENT)
Dept: GASTROENTEROLOGY | Facility: CLINIC | Age: 75
End: 2025-02-13
Payer: MEDICARE

## 2025-02-13 VITALS
OXYGEN SATURATION: 98 % | WEIGHT: 142 LBS | BODY MASS INDEX: 25.16 KG/M2 | HEIGHT: 63 IN | DIASTOLIC BLOOD PRESSURE: 72 MMHG | SYSTOLIC BLOOD PRESSURE: 110 MMHG | HEART RATE: 62 BPM | TEMPERATURE: 97.9 F

## 2025-02-13 DIAGNOSIS — Z80.0 FAMILY HISTORY OF COLON CANCER: ICD-10-CM

## 2025-02-13 DIAGNOSIS — K21.00 GASTROESOPHAGEAL REFLUX DISEASE WITH ESOPHAGITIS, UNSPECIFIED WHETHER HEMORRHAGE: ICD-10-CM

## 2025-02-13 DIAGNOSIS — Z86.0101 HISTORY OF ADENOMATOUS POLYP OF COLON: Primary | ICD-10-CM

## 2025-02-13 RX ORDER — FAMOTIDINE 40 MG/1
40 TABLET, FILM COATED ORAL DAILY
Qty: 30 TABLET | Refills: 11 | Status: SHIPPED | OUTPATIENT
Start: 2025-02-13

## 2025-02-13 RX ORDER — ACETAMINOPHEN 325 MG/1
325 TABLET ORAL DAILY
COMMUNITY

## 2025-02-13 NOTE — PROGRESS NOTES
Primary Physician: Komal Laws, DO    Chief Complaint   Patient presents with    Med Refill     Pt presents today for yearly OV for GERD-Needs refills on Famotidine; Pt re-started Famotidine back in Jan due to increased indigestion at that time and states it has helped quite a bit       Subjective     Kaelyn Conte is a 74 y.o. female.    HPI  History of adenomatous colon polyps  10/25/2023 colonoscopy revealing multiple small mouth diverticula of the left colon as well as a 5 mm tubular adenomatous polyp removed from the cecum.    No diarrhea, constipation, abdominal pain or rectal bleeding to report.  She does feel like at times when she is having a bowel movement she has to rock her body in a position to help the stool evacuate her rectum.  When she does this she has no issues.  I have recommended she get a stepstool at her toilet and elevate her feet during times of defecation.  If any bowel habit changes occur she is to notify me immediately.    Family history of colon cancer  Son had colon cancer in his 40's.    GERD  Patient with a history of acid reflux disease.  10/25/2023 endoscopy did reveal LA grade a reflux esophagitis.  No endoscopic evidence of Rhoades's esophagus.  Stomach and duodenum were normal.  Patient taking Pepcid 40 mg daily.  2/3/2025 creatinine normal 0.68    She had not been taking any type of Pepcid and she began having some increased acid reflux particularly when she was bending over.  Therefore she started back on the Pepcid and this is completely resolved her symptoms.  Patient denies any reflux at this time.  Patient denies any dysphagia.  She denies any unexpected weight loss.    Past Medical History:   Diagnosis Date    Acid reflux     Allergic rhinitis     Carotid stenosis, asymptomatic     Mild    Diabetes mellitus     Diverticulitis of colon     Diverticulosis     Family history of colon cancer     History of adenomatous polyp of colon     History of colon  polyps     History of ear infections     History of streptococcal sore throat     Meniere's disease     Sleep apnea     cpap at hs    Urinary tract infection        Past Surgical History:   Procedure Laterality Date    COLONOSCOPY N/A 08/31/2018    Diverticulosis in the left colon; The examination was otherwise normal on direct and retroflexion views; No specimens collected; Repeat 5 years    COLONOSCOPY  08/20/2013    Diverticulosis; Otherwise normal; Repeat 10 years    COLONOSCOPY  02/03/2004    Diverticulosis; One 1cm hyperplastic polyp in the transverse colon; Repeat 3 years    COLONOSCOPY N/A 10/25/2023    Diverticulosis in the left colon; One 5mm tubular adenomatous polyp in the cecum; Repeat 5 years    ENDOSCOPY  08/20/2013    LA grade A esophagitis; Antran erythema-biopsied    ENDOSCOPY N/A 10/25/2023    LA Grade A reflux esophagitis with no bleeding; There is no endoscopic evidence of Rhoades's esophagus; Normal stomach; Normal examined duodenum; No specimens collected    HYSTERECTOMY      INNER EAR SURGERY Left     Had ear surgery during early 20's    OOPHORECTOMY      TONSILLECTOMY          Current Outpatient Medications:     acetaminophen (TYLENOL) 325 MG tablet, Take 1 tablet by mouth Daily., Disp: , Rfl:     aspirin 81 MG EC tablet, Take 81 mg by mouth daily., Disp: , Rfl:     atorvastatin (LIPITOR) 10 MG tablet, Take 1 tablet by mouth Daily., Disp: , Rfl:     Calcium Carb-Cholecalciferol 600-20 MG-MCG tablet, Take 1 tablet by mouth Daily., Disp: , Rfl:     Cholecalciferol 25 MCG (1000 UT) tablet, Take 1 tablet by mouth Daily., Disp: , Rfl:     escitalopram (LEXAPRO) 10 MG tablet, Take 1 tablet by mouth Daily., Disp: , Rfl:     famotidine (PEPCID) 40 MG tablet, Take 1 tablet by mouth Daily., Disp: 30 tablet, Rfl: 11    glipizide (GLUCOTROL XL) 5 MG ER tablet, Take 1 tablet by mouth Daily., Disp: , Rfl:     metFORMIN (GLUCOPHAGE) 500 MG tablet, Take 1 tablet by mouth 2 (Two) Times a Day., Disp: , Rfl:  "1    multivitamin with minerals tablet tablet, Take 1 tablet by mouth Daily., Disp: , Rfl:     True Metrix Blood Glucose Test test strip, , Disp: , Rfl:     No Known Allergies    Social History     Socioeconomic History    Marital status:    Tobacco Use    Smoking status: Never    Smokeless tobacco: Never   Vaping Use    Vaping status: Never Used   Substance and Sexual Activity    Alcohol use: Not Currently    Drug use: No    Sexual activity: Defer       Family History   Problem Relation Age of Onset    Cancer Mother     Breast cancer Mother     Stroke Father     Breast cancer Sister     Esophageal cancer Brother 72    No Known Problems Maternal Aunt     No Known Problems Paternal Aunt     No Known Problems Maternal Grandmother     No Known Problems Paternal Grandmother     No Known Problems Daughter     Rectal cancer Son 42    Colon cancer Son 42    BRCA 1/2 Neg Hx     Endometrial cancer Neg Hx     Ovarian cancer Neg Hx     Colon polyps Neg Hx     Liver cancer Neg Hx     Stomach cancer Neg Hx     Liver disease Neg Hx        Review of Systems   HENT:  Negative for trouble swallowing.    Gastrointestinal:  Negative for abdominal pain, blood in stool, constipation and diarrhea.       Objective     /72 (BP Location: Left arm, Patient Position: Sitting, Cuff Size: Adult)   Pulse 62   Temp 97.9 °F (36.6 °C) (Infrared)   Ht 160 cm (63\")   Wt 64.4 kg (142 lb)   LMP  (LMP Unknown)   SpO2 98%   Breastfeeding No   BMI 25.15 kg/m²     Physical Exam  Vitals reviewed.   Constitutional:       Appearance: Normal appearance.   Neurological:      Mental Status: She is alert.         Lab Results - Last 18 Months   Lab Units 02/03/25  0954 10/16/24  1011 05/27/24  1302 01/29/24  1141   GLUCOSE mg/dL 232* 193* 178* 180*   BUN mg/dL 20 19 23 16   CREATININE mg/dL 0.68 0.60 0.71 0.80   SODIUM mmol/L 140 140 141 142   POTASSIUM mmol/L 4.6 4.5 4.4 4.1   CHLORIDE mmol/L 105 105 105 104   CO2 mmol/L 26.0 27.0 24.0 29.0 "   TOTAL PROTEIN g/dL 6.9 6.6 7.4 6.5   ALBUMIN g/dL 4.3 4.1 4.6 4.4   ALT (SGPT) U/L 21 13 16 18   AST (SGOT) U/L 21 16 19 22   ALK PHOS U/L 147* 120* 105 96   BILIRUBIN mg/dL 0.2 0.3 0.5 0.5   GLOBULIN gm/dL 2.6 2.5 2.8 2.1       Lab Results - Last 18 Months   Lab Units 02/03/25  0954 01/29/24  1141   HEMOGLOBIN g/dL 13.6 13.7   HEMATOCRIT % 40.2 40.1   MCV fL 91.6 93.5   WBC 10*3/mm3 7.37 6.69   RDW % 11.9* 11.8*   MPV fL 9.8 10.3   PLATELETS 10*3/mm3 241 244       Lab Results - Last 18 Months   Lab Units 02/03/25  0954 01/29/24  1141   TSH uIU/mL 1.950 1.540              IMPRESSION/PLAN:    Assessment & Plan      Problem List Items Addressed This Visit       History of adenomatous polyp of colon - Primary    Overview     History of hyperplastic colon polyp 2004.  No adenomas 8/20/2013 or 8/20/2018.  Adenoma removed 10/2023.  Repeat colonoscopy 10/2028.           Family history of colon cancer    Overview     Son had colorectal cancer at age 42         Gastroesophageal reflux disease with esophagitis    Overview     LA grade A esophagitis seen 10/2023.  Start pantoprazole 40 mg daily.  Changed to pepcid 40mg daily per pt request for concerns of osteopenia.    Anti-reflux measures were reviewed and discussed with patient.  Pt advised to refrain from chocolate, alcohol, smoking, peppermint and caffeine.  Also advised to limit fatty foods, large meals, and eating late at nighttime.  Advised to reach an ideal body mass index.         Relevant Medications    famotidine (PEPCID) 40 MG tablet     Yearly follow-up  Continue Pepcid.  Call with any dysphagia or bowel changes.              Alyson Mari, APRN  02/14/25  08:36 CST    Part of this note may be an electronic transcription/translation of spoken language to printed text.

## 2025-04-10 ENCOUNTER — TRANSCRIBE ORDERS (OUTPATIENT)
Dept: ADMINISTRATIVE | Facility: HOSPITAL | Age: 75
End: 2025-04-10
Payer: MEDICARE

## 2025-04-10 DIAGNOSIS — Z13.820 ENCOUNTER FOR SCREENING FOR OSTEOPOROSIS: Primary | ICD-10-CM

## 2025-05-01 ENCOUNTER — TRANSCRIBE ORDERS (OUTPATIENT)
Dept: ADMINISTRATIVE | Facility: HOSPITAL | Age: 75
End: 2025-05-01
Payer: MEDICARE

## 2025-05-01 DIAGNOSIS — E11.8 TYPE 2 DIABETES MELLITUS WITH UNSPECIFIED COMPLICATIONS: Primary | ICD-10-CM

## 2025-05-01 DIAGNOSIS — K21.9 GASTRO-ESOPHAGEAL REFLUX DISEASE WITHOUT ESOPHAGITIS: ICD-10-CM

## 2025-05-01 DIAGNOSIS — I10 ESSENTIAL (PRIMARY) HYPERTENSION: ICD-10-CM

## 2025-05-01 DIAGNOSIS — E78.5 HYPERLIPIDEMIA, UNSPECIFIED HYPERLIPIDEMIA TYPE: ICD-10-CM

## 2025-06-17 ENCOUNTER — LAB (OUTPATIENT)
Dept: LAB | Facility: HOSPITAL | Age: 75
End: 2025-06-17
Payer: MEDICARE

## 2025-06-17 LAB
ALBUMIN SERPL-MCNC: 4.2 G/DL (ref 3.5–5.2)
ALBUMIN/GLOB SERPL: 1.9 G/DL
ALP SERPL-CCNC: 114 U/L (ref 39–117)
ALT SERPL W P-5'-P-CCNC: 13 U/L (ref 1–33)
ANION GAP SERPL CALCULATED.3IONS-SCNC: 9 MMOL/L (ref 5–15)
AST SERPL-CCNC: 17 U/L (ref 1–32)
BILIRUB SERPL-MCNC: 0.4 MG/DL (ref 0–1.2)
BUN SERPL-MCNC: 16.6 MG/DL (ref 8–23)
BUN/CREAT SERPL: 31.3 (ref 7–25)
CALCIUM SPEC-SCNC: 8.9 MG/DL (ref 8.6–10.5)
CHLORIDE SERPL-SCNC: 104 MMOL/L (ref 98–107)
CO2 SERPL-SCNC: 27 MMOL/L (ref 22–29)
CREAT SERPL-MCNC: 0.53 MG/DL (ref 0.57–1)
EGFRCR SERPLBLD CKD-EPI 2021: 96.6 ML/MIN/1.73
GLOBULIN UR ELPH-MCNC: 2.2 GM/DL
GLUCOSE SERPL-MCNC: 174 MG/DL (ref 65–99)
HBA1C MFR BLD: 8.8 % (ref 4.8–5.6)
POTASSIUM SERPL-SCNC: 4.4 MMOL/L (ref 3.5–5.2)
PROT SERPL-MCNC: 6.4 G/DL (ref 6–8.5)
SODIUM SERPL-SCNC: 140 MMOL/L (ref 136–145)

## 2025-06-17 PROCEDURE — 83036 HEMOGLOBIN GLYCOSYLATED A1C: CPT | Performed by: FAMILY MEDICINE

## 2025-06-17 PROCEDURE — 80053 COMPREHEN METABOLIC PANEL: CPT | Performed by: FAMILY MEDICINE

## 2025-07-10 ENCOUNTER — OFFICE VISIT (OUTPATIENT)
Age: 75
End: 2025-07-10

## 2025-07-10 VITALS — WEIGHT: 144 LBS | BODY MASS INDEX: 25.52 KG/M2 | HEIGHT: 63 IN

## 2025-07-10 DIAGNOSIS — M17.11 PRIMARY LOCALIZED OSTEOARTHRITIS OF RIGHT KNEE: ICD-10-CM

## 2025-07-10 DIAGNOSIS — M17.12 PRIMARY LOCALIZED OSTEOARTHRITIS OF LEFT KNEE: Primary | ICD-10-CM

## 2025-07-10 RX ORDER — BETAMETHASONE SODIUM PHOSPHATE AND BETAMETHASONE ACETATE 3; 3 MG/ML; MG/ML
12 INJECTION, SUSPENSION INTRA-ARTICULAR; INTRALESIONAL; INTRAMUSCULAR; SOFT TISSUE ONCE
Status: COMPLETED | OUTPATIENT
Start: 2025-07-10 | End: 2025-07-10

## 2025-07-10 RX ORDER — LIDOCAINE HYDROCHLORIDE 10 MG/ML
6 INJECTION, SOLUTION INFILTRATION; PERINEURAL ONCE
Status: COMPLETED | OUTPATIENT
Start: 2025-07-10 | End: 2025-07-10

## 2025-07-10 RX ADMIN — BETAMETHASONE SODIUM PHOSPHATE AND BETAMETHASONE ACETATE 12 MG: 3; 3 INJECTION, SUSPENSION INTRA-ARTICULAR; INTRALESIONAL; INTRAMUSCULAR; SOFT TISSUE at 09:19

## 2025-07-10 RX ADMIN — LIDOCAINE HYDROCHLORIDE 6 ML: 10 INJECTION, SOLUTION INFILTRATION; PERINEURAL at 09:20

## 2025-07-10 RX ADMIN — LIDOCAINE HYDROCHLORIDE 6 ML: 10 INJECTION, SOLUTION INFILTRATION; PERINEURAL at 09:19

## 2025-07-10 NOTE — PROGRESS NOTES
intact distally.  Valgus varus testing is negative.  Tenderness palpation over the medial joint line bilaterally.  Hamstring testing is 5 out of 5 when compared to contralateral side.  Neurovascular intact distally.   Skin:     Findings: No erythema.   Neurological:      Mental Status: She is alert and oriented to person, place, and time.   Psychiatric:         Mood and Affect: Mood normal.         Thought Content: Thought content normal.             Radiology:  No images completed at this visit.    Ht 1.6 m (5' 3\")   Wt 65.3 kg (144 lb)   BMI 25.51 kg/m²   Body mass index is 25.51 kg/m².         Assessment:  1. Primary localized osteoarthritis of left knee    2. Primary localized osteoarthritis of right knee      PROCEDURE NOTE:    KNEE INJECTION:  Risks, benefits, and alternatives were discussed with the patient and an injection was agreed upon today.  With the patient in the seated position, the  bilateral  knee is prepped with isopropyl alcohol and then anesthetized with ethyl chloride.  Using a 21g 1.5in needle the anterolateral joint space is injected with 6mL lidocaine 1% and 2mL celestone 6mg/mL.  The needle is removed, hemostasis is achieved, and a Band-aid was applied.  The patient tolerated procedure well.     Plan:     Several treatment options were discussed in office today.  We did perform steroid injections to both knees today.  She will return in 3 months for repeat injections if needed.  Patient voices understanding and agrees to care plan.    Orders Placed This Encounter   Medications    betamethasone acetate-betamethasone sodium phosphate (CELESTONE) injection 12 mg    lidocaine 1 % injection 6 mL    betamethasone acetate-betamethasone sodium phosphate (CELESTONE) injection 12 mg    lidocaine 1 % injection 6 mL     Orders Placed This Encounter   Procedures    DRAIN/INJECT LARGE JOINT/BURSA     Return in about 3 months (around 10/10/2025) for repeat injection, no xray christy knees.    Plan of care

## 2025-07-11 ASSESSMENT — ENCOUNTER SYMPTOMS: SHORTNESS OF BREATH: 0

## 2025-07-21 ENCOUNTER — OFFICE VISIT (OUTPATIENT)
Age: 75
End: 2025-07-21
Payer: MEDICARE

## 2025-07-21 DIAGNOSIS — M79.672 PAIN IN LEFT FOOT: ICD-10-CM

## 2025-07-21 DIAGNOSIS — M20.22 HALLUX RIGIDUS OF LEFT FOOT: ICD-10-CM

## 2025-07-21 DIAGNOSIS — M19.072 PRIMARY OSTEOARTHRITIS OF LEFT FOOT: Primary | ICD-10-CM

## 2025-07-21 DIAGNOSIS — M79.672 LEFT FOOT PAIN: ICD-10-CM

## 2025-07-21 PROCEDURE — G8427 DOCREV CUR MEDS BY ELIG CLIN: HCPCS | Performed by: PODIATRIST

## 2025-07-21 PROCEDURE — G8419 CALC BMI OUT NRM PARAM NOF/U: HCPCS | Performed by: PODIATRIST

## 2025-07-21 PROCEDURE — G8399 PT W/DXA RESULTS DOCUMENT: HCPCS | Performed by: PODIATRIST

## 2025-07-21 PROCEDURE — 1123F ACP DISCUSS/DSCN MKR DOCD: CPT | Performed by: PODIATRIST

## 2025-07-21 PROCEDURE — 1159F MED LIST DOCD IN RCRD: CPT | Performed by: PODIATRIST

## 2025-07-21 PROCEDURE — 1036F TOBACCO NON-USER: CPT | Performed by: PODIATRIST

## 2025-07-21 PROCEDURE — 99204 OFFICE O/P NEW MOD 45 MIN: CPT | Performed by: PODIATRIST

## 2025-07-21 PROCEDURE — 1090F PRES/ABSN URINE INCON ASSESS: CPT | Performed by: PODIATRIST

## 2025-07-21 PROCEDURE — 3017F COLORECTAL CA SCREEN DOC REV: CPT | Performed by: PODIATRIST

## 2025-07-21 NOTE — PROGRESS NOTES
[facility]  [facility]  [address]  [facility] [id number] [phone]     Patient: Princess Lorenzo   YOB: 1950   Date: 7/21/2025   Visit Type:  Consult    Body Part:  left FOOT    When did the symptoms begin/Date of Onset or date of surgery? Pt states pain started for quite sometime     Pt states she has possible left bunion and with certain shoes it will cause pain    If over 55, have you avitia an Osteoporosis Screening in the last 2 years? Yes    History of Present Illness  Chief Complaint   Patient presents with    LEFT FOOT - CONSULT        This is a 75 y.o. female  presents today complaining of left foot pain.  Pain is in her great toe joint.  She relates a longstanding duration.  Gradual onset.  Progressively worse with time.  She has tried over-the-counter NSAIDs as well as shoe changes over time without much success.  At this point she relates it is difficult for her to even find shoes.    Review of Systems  System  Neg/Pos  Details  Constitutional  Negative  Chills, Fatigue, Fever and Night Sweats  Respiratory  Negative  Chest Pain, Cough and Dyspnea  Cardio   Negative  Leg Swelling  GI   Negative  Abdominal Pain, Constipation, Nausea and Vomiting     Negative  Urinary Incontinence   Endocrine  Negative  Weight Gain and Weight Loss  MS   Negative  Except as noted in HPI and Chief Complaint    Past Medical History:   Diagnosis Date    Cancer (HCC)     lung and breast    Carotid artery occlusion     Hypertension     Osteoarthritis     Osteoporosis     Type II or unspecified type diabetes mellitus without mention of complication, not stated as uncontrolled       Past Surgical History:   Procedure Laterality Date    HYSTERECTOMY (CERVIX STATUS UNKNOWN)      POLYPECTOMY      TONSILLECTOMY        Social History     Socioeconomic History    Marital status:      Spouse name: None    Number of children: None

## 2025-07-21 NOTE — PATIENT INSTRUCTIONS
You will need an offloading shoe after surgery.  This is what Dr. Brown recommends. Please purchase and bring with you on day of surgery.       This is a carbon fiber insole that is recommended for your shoe once you are transitioned out of walking cast boot. Place under insole in your shoe.  Can be purchased from [employer].       Learning About How to Prepare for Surgery  How can you prepare before surgery?     You can do some things that will help you safely prepare for surgery.  Understand exactly what surgery is planned.   You should know the risks, benefits, and other options.  Tell your doctors ALL the medicines, vitamins, supplements, and herbal remedies you take.   Some of these can increase the risk of bleeding. Or they may interact with anesthesia.  Follow your doctor's instructions about which medicines to take or stop before your surgery.  You may need to stop taking some medicines a week or more before surgery.  If you take aspirin or some other blood thinner, be sure to talk to your doctor.  Follow any other instructions your doctor gave you.  If you have an advance directive, let your doctor know, and bring a copy to the hospital.   It may include a living will and a durable power of  for health care. It lets your doctor and loved ones know your health care wishes. If you don't have one, you may want to prepare one.  How can you prepare on the day of surgery?  Here are some tips about what to do at home before you leave for your surgery.  If your doctor told you to take your medicines on the day of surgery, take them with only a sip of water.  Nothing to eat or drink after midnight the night before surgery  Follow the instructions about when to stop eating and drinking.   If you don't, your surgery may be canceled.  Follow your doctor's instructions about when to bathe or shower before your surgery.  Don't use lotions, perfumes, deodorants, or nail polish.  Do not shave the surgical site

## (undated) DEVICE — Device: Brand: DEFENDO AIR/WATER/SUCTION AND BIOPSY VALVE

## (undated) DEVICE — CUFF,BP,DISP,1 TUBE,ADULT,HP: Brand: MEDLINE

## (undated) DEVICE — THE SINGLE USE ETRAP – POLYP TRAP IS USED FOR SUCTION RETRIEVAL OF ENDOSCOPICALLY REMOVED POLYPS.: Brand: ETRAP

## (undated) DEVICE — SENSR O2 OXIMAX FNGR A/ 18IN NONSTR

## (undated) DEVICE — CONMED SCOPE SAVER BITE BLOCK, 20X27 MM: Brand: SCOPE SAVER

## (undated) DEVICE — THE CHANNEL CLEANING BRUSH IS A NYLON FLEXI BRUSH ATTACHED TO A FLEXIBLE PLASTIC SHEATH DESIGNED TO SAFELY REMOVE DEBRIS FROM FLEXIBLE ENDOSCOPES.

## (undated) DEVICE — TBG SMPL FLTR LINE NASL 02/C02 A/ BX/100

## (undated) DEVICE — SNAR POLYP CAPTIVATOR RND STFF 2.4 240CM 10MM 1P/U

## (undated) DEVICE — YANKAUER,BULB TIP WITH VENT: Brand: ARGYLE

## (undated) DEVICE — MASK,OXYGEN,MED CONC,ADLT,7' TUB, UC: Brand: PENDING

## (undated) DEVICE — ENDOGATOR AUXILIARY WATER JET CONNECTOR: Brand: ENDOGATOR